# Patient Record
Sex: MALE | Race: WHITE | NOT HISPANIC OR LATINO | Employment: UNEMPLOYED | URBAN - METROPOLITAN AREA
[De-identification: names, ages, dates, MRNs, and addresses within clinical notes are randomized per-mention and may not be internally consistent; named-entity substitution may affect disease eponyms.]

---

## 2017-10-18 ENCOUNTER — HOSPITAL ENCOUNTER (INPATIENT)
Facility: MEDICAL CENTER | Age: 52
LOS: 1 days | DRG: 350 | End: 2017-10-19
Attending: COLON & RECTAL SURGERY | Admitting: COLON & RECTAL SURGERY
Payer: MEDICAID

## 2017-10-18 ENCOUNTER — RESOLUTE PROFESSIONAL BILLING HOSPITAL PROF FEE (OUTPATIENT)
Dept: HOSPITALIST | Facility: MEDICAL CENTER | Age: 52
End: 2017-10-18
Payer: MEDICAID

## 2017-10-18 PROBLEM — T88.3XXA MALIGNANT HYPERTHERMIA: Status: ACTIVE | Noted: 2017-10-18

## 2017-10-18 PROBLEM — J96.00 ACUTE RESPIRATORY FAILURE (HCC): Status: ACTIVE | Noted: 2017-10-18

## 2017-10-18 PROBLEM — K46.9 RECURRENT HERNIA: Status: ACTIVE | Noted: 2017-10-18

## 2017-10-18 PROBLEM — E87.5 HYPERKALEMIA: Status: ACTIVE | Noted: 2017-10-18

## 2017-10-18 LAB
ABO GROUP BLD: NORMAL
ALBUMIN SERPL BCP-MCNC: 3.1 G/DL (ref 3.2–4.9)
ALBUMIN SERPL BCP-MCNC: 3.5 G/DL (ref 3.2–4.9)
ALBUMIN SERPL BCP-MCNC: 3.9 G/DL (ref 3.2–4.9)
ALBUMIN SERPL BCP-MCNC: 4 G/DL (ref 3.2–4.9)
ALBUMIN SERPL BCP-MCNC: 4 G/DL (ref 3.2–4.9)
ALBUMIN SERPL BCP-MCNC: 4.1 G/DL (ref 3.2–4.9)
ALBUMIN/GLOB SERPL: 1.6 G/DL
ALBUMIN/GLOB SERPL: 1.7 G/DL
ALP SERPL-CCNC: 45 U/L (ref 30–99)
ALP SERPL-CCNC: 49 U/L (ref 30–99)
ALP SERPL-CCNC: 54 U/L (ref 30–99)
ALP SERPL-CCNC: 55 U/L (ref 30–99)
ALP SERPL-CCNC: 56 U/L (ref 30–99)
ALP SERPL-CCNC: 60 U/L (ref 30–99)
ALT SERPL-CCNC: 11 U/L (ref 2–50)
ALT SERPL-CCNC: 33 U/L (ref 2–50)
ALT SERPL-CCNC: 34 U/L (ref 2–50)
ALT SERPL-CCNC: 35 U/L (ref 2–50)
ALT SERPL-CCNC: 36 U/L (ref 2–50)
ALT SERPL-CCNC: 41 U/L (ref 2–50)
ANION GAP SERPL CALC-SCNC: 10 MMOL/L (ref 0–11.9)
ANION GAP SERPL CALC-SCNC: 11 MMOL/L (ref 0–11.9)
ANION GAP SERPL CALC-SCNC: 3 MMOL/L (ref 0–11.9)
ANION GAP SERPL CALC-SCNC: 5 MMOL/L (ref 0–11.9)
ANION GAP SERPL CALC-SCNC: 5 MMOL/L (ref 0–11.9)
ANION GAP SERPL CALC-SCNC: 6 MMOL/L (ref 0–11.9)
ANION GAP SERPL CALC-SCNC: 6 MMOL/L (ref 0–11.9)
AST SERPL-CCNC: 12 U/L (ref 12–45)
AST SERPL-CCNC: 31 U/L (ref 12–45)
AST SERPL-CCNC: 35 U/L (ref 12–45)
AST SERPL-CCNC: 36 U/L (ref 12–45)
AST SERPL-CCNC: 47 U/L (ref 12–45)
AST SERPL-CCNC: 48 U/L (ref 12–45)
BASE EXCESS BLDA CALC-SCNC: -4 MMOL/L (ref -4–3)
BASE EXCESS BLDA CALC-SCNC: -4 MMOL/L (ref -4–3)
BASE EXCESS BLDA CALC-SCNC: 1 MMOL/L (ref -4–3)
BASE EXCESS BLDA CALC-SCNC: 1 MMOL/L (ref -4–3)
BASE EXCESS BLDA CALC-SCNC: 2 MMOL/L (ref -4–3)
BASE EXCESS BLDA CALC-SCNC: 4 MMOL/L (ref -4–3)
BASOPHILS # BLD AUTO: 0 % (ref 0–1.8)
BASOPHILS # BLD: 0 K/UL (ref 0–0.12)
BILIRUB SERPL-MCNC: 0.4 MG/DL (ref 0.1–1.5)
BILIRUB SERPL-MCNC: 0.6 MG/DL (ref 0.1–1.5)
BLD GP AB SCN SERPL QL: NORMAL
BODY TEMPERATURE: ABNORMAL CENTIGRADE
BODY TEMPERATURE: ABNORMAL DEGREES
BUN SERPL-MCNC: 19 MG/DL (ref 8–22)
BUN SERPL-MCNC: 19 MG/DL (ref 8–22)
BUN SERPL-MCNC: 20 MG/DL (ref 8–22)
BUN SERPL-MCNC: 23 MG/DL (ref 8–22)
BUN SERPL-MCNC: 24 MG/DL (ref 8–22)
BUN SERPL-MCNC: 24 MG/DL (ref 8–22)
BUN SERPL-MCNC: 28 MG/DL (ref 8–22)
CA-I BLD ISE-SCNC: 1.11 MMOL/L (ref 1.1–1.3)
CA-I BLD ISE-SCNC: 1.27 MMOL/L (ref 1.1–1.3)
CALCIUM SERPL-MCNC: 7.6 MG/DL (ref 8.5–10.5)
CALCIUM SERPL-MCNC: 7.8 MG/DL (ref 8.5–10.5)
CALCIUM SERPL-MCNC: 8.6 MG/DL (ref 8.5–10.5)
CALCIUM SERPL-MCNC: 8.9 MG/DL (ref 8.5–10.5)
CALCIUM SERPL-MCNC: 9 MG/DL (ref 8.5–10.5)
CALCIUM SERPL-MCNC: 9.2 MG/DL (ref 8.5–10.5)
CALCIUM SERPL-MCNC: 9.2 MG/DL (ref 8.5–10.5)
CHLORIDE SERPL-SCNC: 100 MMOL/L (ref 96–112)
CHLORIDE SERPL-SCNC: 101 MMOL/L (ref 96–112)
CHLORIDE SERPL-SCNC: 103 MMOL/L (ref 96–112)
CHLORIDE SERPL-SCNC: 104 MMOL/L (ref 96–112)
CHLORIDE SERPL-SCNC: 104 MMOL/L (ref 96–112)
CHLORIDE SERPL-SCNC: 108 MMOL/L (ref 96–112)
CHLORIDE SERPL-SCNC: 108 MMOL/L (ref 96–112)
CK SERPL-CCNC: 30 U/L (ref 0–154)
CK SERPL-CCNC: 60 U/L (ref 0–154)
CK SERPL-CCNC: 67 U/L (ref 0–154)
CK SERPL-CCNC: 71 U/L (ref 0–154)
CK SERPL-CCNC: 73 U/L (ref 0–154)
CK SERPL-CCNC: 80 U/L (ref 0–154)
CO2 BLDA-SCNC: 26 MMOL/L (ref 20–33)
CO2 BLDA-SCNC: 27 MMOL/L (ref 20–33)
CO2 BLDA-SCNC: 28 MMOL/L (ref 20–33)
CO2 BLDA-SCNC: 28 MMOL/L (ref 20–33)
CO2 BLDA-SCNC: 31 MMOL/L (ref 20–33)
CO2 SERPL-SCNC: 20 MMOL/L (ref 20–33)
CO2 SERPL-SCNC: 22 MMOL/L (ref 20–33)
CO2 SERPL-SCNC: 23 MMOL/L (ref 20–33)
CO2 SERPL-SCNC: 23 MMOL/L (ref 20–33)
CO2 SERPL-SCNC: 24 MMOL/L (ref 20–33)
CO2 SERPL-SCNC: 24 MMOL/L (ref 20–33)
CO2 SERPL-SCNC: 26 MMOL/L (ref 20–33)
CREAT SERPL-MCNC: 0.86 MG/DL (ref 0.5–1.4)
CREAT SERPL-MCNC: 0.96 MG/DL (ref 0.5–1.4)
CREAT SERPL-MCNC: 1.03 MG/DL (ref 0.5–1.4)
CREAT SERPL-MCNC: 1.04 MG/DL (ref 0.5–1.4)
CREAT SERPL-MCNC: 1.07 MG/DL (ref 0.5–1.4)
CREAT SERPL-MCNC: 1.07 MG/DL (ref 0.5–1.4)
CREAT SERPL-MCNC: 1.39 MG/DL (ref 0.5–1.4)
EKG IMPRESSION: NORMAL
EKG IMPRESSION: NORMAL
EOSINOPHIL # BLD AUTO: 0.49 K/UL (ref 0–0.51)
EOSINOPHIL NFR BLD: 3 % (ref 0–6.9)
ERYTHROCYTE [DISTWIDTH] IN BLOOD BY AUTOMATED COUNT: 45.3 FL (ref 35.9–50)
GFR SERPL CREATININE-BSD FRML MDRD: 54 ML/MIN/1.73 M 2
GFR SERPL CREATININE-BSD FRML MDRD: >60 ML/MIN/1.73 M 2
GLOBULIN SER CALC-MCNC: 1.8 G/DL (ref 1.9–3.5)
GLOBULIN SER CALC-MCNC: 2.2 G/DL (ref 1.9–3.5)
GLOBULIN SER CALC-MCNC: 2.4 G/DL (ref 1.9–3.5)
GLOBULIN SER CALC-MCNC: 2.5 G/DL (ref 1.9–3.5)
GLUCOSE BLD-MCNC: 156 MG/DL (ref 65–99)
GLUCOSE SERPL-MCNC: 109 MG/DL (ref 65–99)
GLUCOSE SERPL-MCNC: 110 MG/DL (ref 65–99)
GLUCOSE SERPL-MCNC: 126 MG/DL (ref 65–99)
GLUCOSE SERPL-MCNC: 128 MG/DL (ref 65–99)
GLUCOSE SERPL-MCNC: 133 MG/DL (ref 65–99)
GLUCOSE SERPL-MCNC: 150 MG/DL (ref 65–99)
GLUCOSE SERPL-MCNC: 87 MG/DL (ref 65–99)
HCO3 BLDA-SCNC: 24 MMOL/L (ref 17–25)
HCO3 BLDA-SCNC: 25 MMOL/L (ref 17–25)
HCO3 BLDA-SCNC: 26.1 MMOL/L (ref 17–25)
HCO3 BLDA-SCNC: 26.3 MMOL/L (ref 17–25)
HCO3 BLDA-SCNC: 26.5 MMOL/L (ref 17–25)
HCO3 BLDA-SCNC: 28 MMOL/L (ref 17–25)
HCT VFR BLD AUTO: 39 % (ref 42–52)
HCT VFR BLD CALC: 40 % (ref 42–52)
HCT VFR BLD CALC: 42 % (ref 42–52)
HGB BLD-MCNC: 13.4 G/DL (ref 14–18)
HGB BLD-MCNC: 13.6 G/DL (ref 14–18)
HGB BLD-MCNC: 14.3 G/DL (ref 14–18)
LACTATE BLD-SCNC: 1.7 MMOL/L (ref 0.5–2)
LYMPHOCYTES # BLD AUTO: 8.29 K/UL (ref 1–4.8)
LYMPHOCYTES NFR BLD: 51.2 % (ref 22–41)
MANUAL DIFF BLD: NORMAL
MCH RBC QN AUTO: 33.5 PG (ref 27–33)
MCHC RBC AUTO-ENTMCNC: 34.4 G/DL (ref 33.7–35.3)
MCV RBC AUTO: 97.5 FL (ref 81.4–97.8)
MONOCYTES # BLD AUTO: 0.79 K/UL (ref 0–0.85)
MONOCYTES NFR BLD AUTO: 4.9 % (ref 0–13.4)
MORPHOLOGY BLD-IMP: NORMAL
MYELOCYTES NFR BLD MANUAL: 0.5 %
MYOGLOBIN SERPL-MCNC: 42 NG/ML (ref 0–110)
MYOGLOBIN SERPL-MCNC: 61 NG/ML (ref 0–110)
MYOGLOBIN SERPL-MCNC: 67 NG/ML (ref 0–110)
MYOGLOBIN SERPL-MCNC: 71 NG/ML (ref 0–110)
MYOGLOBIN SERPL-MCNC: 73 NG/ML (ref 0–110)
NEUTROPHILS # BLD AUTO: 6.46 K/UL (ref 1.82–7.42)
NEUTROPHILS NFR BLD: 39.4 % (ref 44–72)
NEUTS BAND NFR BLD MANUAL: 0.5 % (ref 0–10)
NRBC # BLD AUTO: 0 K/UL
NRBC BLD AUTO-RTO: 0 /100 WBC
O2/TOTAL GAS SETTING VFR VENT: 100 %
O2/TOTAL GAS SETTING VFR VENT: 28 %
O2/TOTAL GAS SETTING VFR VENT: 30 %
O2/TOTAL GAS SETTING VFR VENT: 40 %
PATH REV: NORMAL
PATH REV: NORMAL
PCO2 BLDA: 34.2 MMHG (ref 26–37)
PCO2 BLDA: 35.5 MMHG (ref 26–37)
PCO2 BLDA: 39 MMHG (ref 26–37)
PCO2 BLDA: 42.5 MMHG (ref 26–37)
PCO2 BLDA: 55.8 MMHG (ref 26–37)
PCO2 BLDA: 89.2 MMHG (ref 26–37)
PCO2 TEMP ADJ BLDA: 35.6 MMHG (ref 26–37)
PCO2 TEMP ADJ BLDA: 36.3 MMHG (ref 26–37)
PCO2 TEMP ADJ BLDA: 39.4 MMHG (ref 26–37)
PCO2 TEMP ADJ BLDA: 41.6 MMHG (ref 26–37)
PH BLDA: 7.1 [PH] (ref 7.4–7.5)
PH BLDA: 7.24 [PH] (ref 7.4–7.5)
PH BLDA: 7.4 [PH] (ref 7.4–7.5)
PH BLDA: 7.43 [PH] (ref 7.4–7.5)
PH BLDA: 7.46 [PH] (ref 7.4–7.5)
PH BLDA: 7.5 [PH] (ref 7.4–7.5)
PH TEMP ADJ BLDA: 7.41 [PH] (ref 7.4–7.5)
PH TEMP ADJ BLDA: 7.43 [PH] (ref 7.4–7.5)
PH TEMP ADJ BLDA: 7.45 [PH] (ref 7.4–7.5)
PH TEMP ADJ BLDA: 7.48 [PH] (ref 7.4–7.5)
PLATELET # BLD AUTO: 215 K/UL (ref 164–446)
PLATELET BLD QL SMEAR: NORMAL
PMV BLD AUTO: 11.6 FL (ref 9–12.9)
PO2 BLDA: 109 MMHG (ref 64–87)
PO2 BLDA: 127 MMHG (ref 64–87)
PO2 BLDA: 136 MMHG (ref 64–87)
PO2 BLDA: 156.6 MMHG (ref 64–87)
PO2 BLDA: 401 MMHG (ref 64–87)
PO2 BLDA: 87 MMHG (ref 64–87)
PO2 TEMP ADJ BLDA: 130 MMHG (ref 64–87)
PO2 TEMP ADJ BLDA: 138 MMHG (ref 64–87)
PO2 TEMP ADJ BLDA: 398 MMHG (ref 64–87)
PO2 TEMP ADJ BLDA: 92 MMHG (ref 64–87)
POTASSIUM BLD-SCNC: 4.8 MMOL/L (ref 3.6–5.5)
POTASSIUM BLD-SCNC: 5.9 MMOL/L (ref 3.6–5.5)
POTASSIUM SERPL-SCNC: 4.8 MMOL/L (ref 3.6–5.5)
POTASSIUM SERPL-SCNC: 4.8 MMOL/L (ref 3.6–5.5)
POTASSIUM SERPL-SCNC: 4.9 MMOL/L (ref 3.6–5.5)
POTASSIUM SERPL-SCNC: 5.5 MMOL/L (ref 3.6–5.5)
POTASSIUM SERPL-SCNC: 5.5 MMOL/L (ref 3.6–5.5)
POTASSIUM SERPL-SCNC: 6 MMOL/L (ref 3.6–5.5)
POTASSIUM SERPL-SCNC: 6.6 MMOL/L (ref 3.6–5.5)
PROT SERPL-MCNC: 4.9 G/DL (ref 6–8.2)
PROT SERPL-MCNC: 5.7 G/DL (ref 6–8.2)
PROT SERPL-MCNC: 6.3 G/DL (ref 6–8.2)
PROT SERPL-MCNC: 6.4 G/DL (ref 6–8.2)
PROT SERPL-MCNC: 6.5 G/DL (ref 6–8.2)
PROT SERPL-MCNC: 6.5 G/DL (ref 6–8.2)
RBC # BLD AUTO: 4 M/UL (ref 4.7–6.1)
RBC BLD AUTO: PRESENT
RH BLD: NORMAL
SAO2 % BLDA: 100 % (ref 93–99)
SAO2 % BLDA: 95 % (ref 93–99)
SAO2 % BLDA: 98 % (ref 93–99)
SAO2 % BLDA: 98.4 % (ref 93–99)
SAO2 % BLDA: 99 % (ref 93–99)
SAO2 % BLDA: 99 % (ref 93–99)
SMUDGE CELLS BLD QL SMEAR: NORMAL
SODIUM BLD-SCNC: 133 MMOL/L (ref 135–145)
SODIUM BLD-SCNC: 135 MMOL/L (ref 135–145)
SODIUM SERPL-SCNC: 130 MMOL/L (ref 135–145)
SODIUM SERPL-SCNC: 131 MMOL/L (ref 135–145)
SODIUM SERPL-SCNC: 132 MMOL/L (ref 135–145)
SODIUM SERPL-SCNC: 135 MMOL/L (ref 135–145)
SODIUM SERPL-SCNC: 135 MMOL/L (ref 135–145)
SODIUM SERPL-SCNC: 136 MMOL/L (ref 135–145)
SODIUM SERPL-SCNC: 137 MMOL/L (ref 135–145)
SPECIMEN DRAWN FROM PATIENT: ABNORMAL
TRIGL SERPL-MCNC: 342 MG/DL (ref 0–149)
TROPONIN I SERPL-MCNC: <0.01 NG/ML (ref 0–0.04)
WBC # BLD AUTO: 16.2 K/UL (ref 4.8–10.8)
WBC OTHER NFR BLD MANUAL: 0.5 %

## 2017-10-18 PROCEDURE — 80048 BASIC METABOLIC PNL TOTAL CA: CPT

## 2017-10-18 PROCEDURE — 501568 HCHG TROCAR, BLUNTPORT 12MM: Performed by: COLON & RECTAL SURGERY

## 2017-10-18 PROCEDURE — C1781 MESH (IMPLANTABLE): HCPCS | Performed by: COLON & RECTAL SURGERY

## 2017-10-18 PROCEDURE — 85014 HEMATOCRIT: CPT

## 2017-10-18 PROCEDURE — 93010 ELECTROCARDIOGRAM REPORT: CPT | Mod: 76 | Performed by: INTERNAL MEDICINE

## 2017-10-18 PROCEDURE — 700101 HCHG RX REV CODE 250

## 2017-10-18 PROCEDURE — 700111 HCHG RX REV CODE 636 W/ 250 OVERRIDE (IP): Performed by: SURGERY

## 2017-10-18 PROCEDURE — 86901 BLOOD TYPING SEROLOGIC RH(D): CPT

## 2017-10-18 PROCEDURE — 82550 ASSAY OF CK (CPK): CPT | Mod: 91

## 2017-10-18 PROCEDURE — 501838 HCHG SUTURE GENERAL: Performed by: COLON & RECTAL SURGERY

## 2017-10-18 PROCEDURE — 501497 HCHG SURGICLIP: Performed by: COLON & RECTAL SURGERY

## 2017-10-18 PROCEDURE — 700105 HCHG RX REV CODE 258: Performed by: SURGERY

## 2017-10-18 PROCEDURE — 86850 RBC ANTIBODY SCREEN: CPT

## 2017-10-18 PROCEDURE — 80053 COMPREHEN METABOLIC PANEL: CPT

## 2017-10-18 PROCEDURE — 502571 HCHG PACK, LAP CHOLE: Performed by: COLON & RECTAL SURGERY

## 2017-10-18 PROCEDURE — 160009 HCHG ANES TIME/MIN: Performed by: COLON & RECTAL SURGERY

## 2017-10-18 PROCEDURE — 99291 CRITICAL CARE FIRST HOUR: CPT | Performed by: SURGERY

## 2017-10-18 PROCEDURE — 700111 HCHG RX REV CODE 636 W/ 250 OVERRIDE (IP): Performed by: ANESTHESIOLOGY

## 2017-10-18 PROCEDURE — 700105 HCHG RX REV CODE 258: Performed by: ANESTHESIOLOGY

## 2017-10-18 PROCEDURE — 0YU54JZ SUPPLEMENT RIGHT INGUINAL REGION WITH SYNTHETIC SUBSTITUTE, PERCUTANEOUS ENDOSCOPIC APPROACH: ICD-10-PCS | Performed by: COLON & RECTAL SURGERY

## 2017-10-18 PROCEDURE — 94002 VENT MGMT INPAT INIT DAY: CPT

## 2017-10-18 PROCEDURE — 500048 HCHG BALLOON, TROCAR FOR HERNIA: Performed by: COLON & RECTAL SURGERY

## 2017-10-18 PROCEDURE — 99292 CRITICAL CARE ADDL 30 MIN: CPT | Performed by: SURGERY

## 2017-10-18 PROCEDURE — 501574 HCHG TROCAR, SMTH CAN&SEAL 5: Performed by: COLON & RECTAL SURGERY

## 2017-10-18 PROCEDURE — 84295 ASSAY OF SERUM SODIUM: CPT | Mod: 91

## 2017-10-18 PROCEDURE — A9270 NON-COVERED ITEM OR SERVICE: HCPCS | Performed by: SURGERY

## 2017-10-18 PROCEDURE — 85007 BL SMEAR W/DIFF WBC COUNT: CPT

## 2017-10-18 PROCEDURE — 82330 ASSAY OF CALCIUM: CPT | Mod: 91

## 2017-10-18 PROCEDURE — 83874 ASSAY OF MYOGLOBIN: CPT

## 2017-10-18 PROCEDURE — 700102 HCHG RX REV CODE 250 W/ 637 OVERRIDE(OP): Performed by: SURGERY

## 2017-10-18 PROCEDURE — 86900 BLOOD TYPING SEROLOGIC ABO: CPT

## 2017-10-18 PROCEDURE — 82947 ASSAY GLUCOSE BLOOD QUANT: CPT

## 2017-10-18 PROCEDURE — 770022 HCHG ROOM/CARE - ICU (200)

## 2017-10-18 PROCEDURE — 82803 BLOOD GASES ANY COMBINATION: CPT | Mod: 91

## 2017-10-18 PROCEDURE — 700111 HCHG RX REV CODE 636 W/ 250 OVERRIDE (IP)

## 2017-10-18 PROCEDURE — 84132 ASSAY OF SERUM POTASSIUM: CPT

## 2017-10-18 PROCEDURE — 84484 ASSAY OF TROPONIN QUANT: CPT

## 2017-10-18 PROCEDURE — 85027 COMPLETE CBC AUTOMATED: CPT

## 2017-10-18 PROCEDURE — 501570 HCHG TROCAR, SEPARATOR: Performed by: COLON & RECTAL SURGERY

## 2017-10-18 PROCEDURE — 160028 HCHG SURGERY MINUTES - 1ST 30 MINS LEVEL 3: Performed by: COLON & RECTAL SURGERY

## 2017-10-18 PROCEDURE — 84478 ASSAY OF TRIGLYCERIDES: CPT

## 2017-10-18 PROCEDURE — 700101 HCHG RX REV CODE 250: Performed by: SURGERY

## 2017-10-18 PROCEDURE — 37799 UNLISTED PX VASCULAR SURGERY: CPT

## 2017-10-18 PROCEDURE — 160039 HCHG SURGERY MINUTES - EA ADDL 1 MIN LEVEL 3: Performed by: COLON & RECTAL SURGERY

## 2017-10-18 PROCEDURE — 501583 HCHG TROCAR, THRD CAN&SEAL 5X100: Performed by: COLON & RECTAL SURGERY

## 2017-10-18 PROCEDURE — 93005 ELECTROCARDIOGRAM TRACING: CPT | Performed by: SURGERY

## 2017-10-18 PROCEDURE — 80500 HCHG CLINICAL PATH CONSULT-LIMITED: CPT

## 2017-10-18 PROCEDURE — 160048 HCHG OR STATISTICAL LEVEL 1-5: Performed by: COLON & RECTAL SURGERY

## 2017-10-18 PROCEDURE — 83605 ASSAY OF LACTIC ACID: CPT

## 2017-10-18 PROCEDURE — 93005 ELECTROCARDIOGRAM TRACING: CPT | Performed by: COLON & RECTAL SURGERY

## 2017-10-18 DEVICE — MESH 3D MAX RIGHT 10.8 X 16CM - LARGE (1EA/CA): Type: IMPLANTABLE DEVICE | Status: FUNCTIONAL

## 2017-10-18 DEVICE — MESH PERFIX PLUG LARGE - 4.1CM X 4.8CM (1EA/CA): Type: IMPLANTABLE DEVICE | Status: FUNCTIONAL

## 2017-10-18 RX ORDER — BISACODYL 10 MG
10 SUPPOSITORY, RECTAL RECTAL
Status: DISCONTINUED | OUTPATIENT
Start: 2017-10-18 | End: 2017-10-19 | Stop reason: HOSPADM

## 2017-10-18 RX ORDER — OXYCODONE HYDROCHLORIDE 10 MG/1
10 TABLET ORAL
Status: DISCONTINUED | OUTPATIENT
Start: 2017-10-18 | End: 2017-10-19 | Stop reason: HOSPADM

## 2017-10-18 RX ORDER — OXYCODONE HYDROCHLORIDE 5 MG/1
5-10 TABLET ORAL
Status: DISCONTINUED | OUTPATIENT
Start: 2017-10-18 | End: 2017-10-18

## 2017-10-18 RX ORDER — AMOXICILLIN 250 MG
1 CAPSULE ORAL NIGHTLY
Status: DISCONTINUED | OUTPATIENT
Start: 2017-10-18 | End: 2017-10-19 | Stop reason: HOSPADM

## 2017-10-18 RX ORDER — FAMOTIDINE 20 MG/1
20 TABLET, FILM COATED ORAL 2 TIMES DAILY
Status: DISCONTINUED | OUTPATIENT
Start: 2017-10-18 | End: 2017-10-19 | Stop reason: HOSPADM

## 2017-10-18 RX ORDER — BACITRACIN 50000 [IU]/1
INJECTION, POWDER, FOR SOLUTION INTRAMUSCULAR
Status: DISCONTINUED | OUTPATIENT
Start: 2017-10-18 | End: 2017-10-18 | Stop reason: HOSPADM

## 2017-10-18 RX ORDER — OXYCODONE HYDROCHLORIDE 5 MG/1
5 TABLET ORAL
Status: DISCONTINUED | OUTPATIENT
Start: 2017-10-18 | End: 2017-10-19 | Stop reason: HOSPADM

## 2017-10-18 RX ORDER — CHLORHEXIDINE GLUCONATE ORAL RINSE 1.2 MG/ML
15 SOLUTION DENTAL EVERY 12 HOURS
Status: DISCONTINUED | OUTPATIENT
Start: 2017-10-18 | End: 2017-10-18

## 2017-10-18 RX ORDER — LIDOCAINE AND PRILOCAINE 25; 25 MG/G; MG/G
1 CREAM TOPICAL
Status: COMPLETED | OUTPATIENT
Start: 2017-10-18 | End: 2017-10-18

## 2017-10-18 RX ORDER — LISINOPRIL 5 MG/1
5 TABLET ORAL DAILY
COMMUNITY

## 2017-10-18 RX ORDER — LIDOCAINE HYDROCHLORIDE 10 MG/ML
0.5 INJECTION, SOLUTION INFILTRATION; PERINEURAL
Status: COMPLETED | OUTPATIENT
Start: 2017-10-18 | End: 2017-10-18

## 2017-10-18 RX ORDER — SODIUM CHLORIDE, SODIUM LACTATE, POTASSIUM CHLORIDE, CALCIUM CHLORIDE 600; 310; 30; 20 MG/100ML; MG/100ML; MG/100ML; MG/100ML
INJECTION, SOLUTION INTRAVENOUS CONTINUOUS
Status: DISCONTINUED | OUTPATIENT
Start: 2017-10-18 | End: 2017-10-19 | Stop reason: HOSPADM

## 2017-10-18 RX ORDER — DOCUSATE SODIUM 100 MG/1
100 CAPSULE, LIQUID FILLED ORAL 2 TIMES DAILY
Status: DISCONTINUED | OUTPATIENT
Start: 2017-10-18 | End: 2017-10-19 | Stop reason: HOSPADM

## 2017-10-18 RX ORDER — BUPIVACAINE HYDROCHLORIDE AND EPINEPHRINE 5; 5 MG/ML; UG/ML
INJECTION, SOLUTION EPIDURAL; INTRACAUDAL; PERINEURAL
Status: DISCONTINUED | OUTPATIENT
Start: 2017-10-18 | End: 2017-10-18 | Stop reason: HOSPADM

## 2017-10-18 RX ORDER — HYDRALAZINE HYDROCHLORIDE 20 MG/ML
20 INJECTION INTRAMUSCULAR; INTRAVENOUS EVERY 4 HOURS PRN
Status: DISCONTINUED | OUTPATIENT
Start: 2017-10-18 | End: 2017-10-19 | Stop reason: HOSPADM

## 2017-10-18 RX ORDER — POLYETHYLENE GLYCOL 3350 17 G/17G
1 POWDER, FOR SOLUTION ORAL 2 TIMES DAILY
Status: DISCONTINUED | OUTPATIENT
Start: 2017-10-18 | End: 2017-10-19 | Stop reason: HOSPADM

## 2017-10-18 RX ORDER — LIDOCAINE HYDROCHLORIDE 10 MG/ML
INJECTION, SOLUTION INFILTRATION; PERINEURAL
Status: COMPLETED
Start: 2017-10-18 | End: 2017-10-18

## 2017-10-18 RX ORDER — LISINOPRIL 10 MG/1
10 TABLET ORAL DAILY
Status: ON HOLD | COMMUNITY
End: 2017-10-18

## 2017-10-18 RX ORDER — SODIUM CHLORIDE 9 MG/ML
INJECTION, SOLUTION INTRAVENOUS CONTINUOUS
Status: DISCONTINUED | OUTPATIENT
Start: 2017-10-18 | End: 2017-10-18

## 2017-10-18 RX ORDER — ENEMA 19; 7 G/133ML; G/133ML
1 ENEMA RECTAL
Status: DISCONTINUED | OUTPATIENT
Start: 2017-10-18 | End: 2017-10-19 | Stop reason: HOSPADM

## 2017-10-18 RX ORDER — AMOXICILLIN 250 MG
1 CAPSULE ORAL
Status: DISCONTINUED | OUTPATIENT
Start: 2017-10-18 | End: 2017-10-19 | Stop reason: HOSPADM

## 2017-10-18 RX ORDER — ONDANSETRON 2 MG/ML
4 INJECTION INTRAMUSCULAR; INTRAVENOUS EVERY 4 HOURS PRN
Status: DISCONTINUED | OUTPATIENT
Start: 2017-10-18 | End: 2017-10-19 | Stop reason: HOSPADM

## 2017-10-18 RX ORDER — LABETALOL HYDROCHLORIDE 5 MG/ML
10 INJECTION, SOLUTION INTRAVENOUS EVERY 4 HOURS PRN
Status: DISCONTINUED | OUTPATIENT
Start: 2017-10-18 | End: 2017-10-19 | Stop reason: HOSPADM

## 2017-10-18 RX ORDER — HYDROXYZINE 50 MG/1
50 TABLET, FILM COATED ORAL
COMMUNITY

## 2017-10-18 RX ADMIN — PROPOFOL 80 MCG/KG/MIN: 10 INJECTION, EMULSION INTRAVENOUS at 11:58

## 2017-10-18 RX ADMIN — FAMOTIDINE 20 MG: 10 INJECTION, SOLUTION INTRAVENOUS at 10:40

## 2017-10-18 RX ADMIN — SODIUM CHLORIDE, SODIUM LACTATE, POTASSIUM CHLORIDE, CALCIUM CHLORIDE: 600; 310; 30; 20 INJECTION, SOLUTION INTRAVENOUS at 08:00

## 2017-10-18 RX ADMIN — SODIUM BICARBONATE 150 MEQ: 84 INJECTION, SOLUTION INTRAVENOUS at 10:48

## 2017-10-18 RX ADMIN — CHLORHEXIDINE GLUCONATE 15 ML: 1.2 RINSE ORAL at 10:40

## 2017-10-18 RX ADMIN — OXYCODONE HYDROCHLORIDE 5 MG: 5 TABLET ORAL at 20:28

## 2017-10-18 RX ADMIN — HYDROMORPHONE HYDROCHLORIDE 1 MG: 1 INJECTION, SOLUTION INTRAMUSCULAR; INTRAVENOUS; SUBCUTANEOUS at 17:02

## 2017-10-18 RX ADMIN — FAMOTIDINE 20 MG: 20 TABLET, FILM COATED ORAL at 20:28

## 2017-10-18 RX ADMIN — LIDOCAINE HYDROCHLORIDE 0.5 ML: 10 INJECTION, SOLUTION INFILTRATION; PERINEURAL at 08:00

## 2017-10-18 RX ADMIN — OXYCODONE HYDROCHLORIDE 10 MG: 10 TABLET ORAL at 23:49

## 2017-10-18 RX ADMIN — SODIUM CHLORIDE: 9 INJECTION, SOLUTION INTRAVENOUS at 10:43

## 2017-10-18 RX ADMIN — HYDRALAZINE HYDROCHLORIDE 20 MG: 20 INJECTION INTRAMUSCULAR; INTRAVENOUS at 16:59

## 2017-10-18 RX ADMIN — SODIUM BICARBONATE 150 MEQ: 84 INJECTION, SOLUTION INTRAVENOUS at 22:01

## 2017-10-18 RX ADMIN — HYDROMORPHONE HYDROCHLORIDE 1 MG: 1 INJECTION, SOLUTION INTRAMUSCULAR; INTRAVENOUS; SUBCUTANEOUS at 10:40

## 2017-10-18 RX ADMIN — LABETALOL HYDROCHLORIDE 10 MG: 5 INJECTION, SOLUTION INTRAVENOUS at 20:28

## 2017-10-18 RX ADMIN — HYDRALAZINE HYDROCHLORIDE 20 MG: 20 INJECTION INTRAMUSCULAR; INTRAVENOUS at 10:48

## 2017-10-18 RX ADMIN — PROPOFOL 40 MCG/KG/MIN: 10 INJECTION, EMULSION INTRAVENOUS at 09:48

## 2017-10-18 ASSESSMENT — LIFESTYLE VARIABLES
TOTAL SCORE: 3
HAVE YOU EVER FELT YOU SHOULD CUT DOWN ON YOUR DRINKING: YES
EVER_SMOKED: YES
EVER_SMOKED: YES
ALCOHOL_USE: YES
CONSUMPTION TOTAL: POSITIVE
HAVE PEOPLE ANNOYED YOU BY CRITICIZING YOUR DRINKING: NO
EVER HAD A DRINK FIRST THING IN THE MORNING TO STEADY YOUR NERVES TO GET RID OF A HANGOVER: YES
DOES PATIENT WANT TO TALK TO SOMEONE ABOUT QUITTING: NO
EVER FELT BAD OR GUILTY ABOUT YOUR DRINKING: YES
TOTAL SCORE: 3
TOTAL SCORE: 3
ON A TYPICAL DAY WHEN YOU DRINK ALCOHOL HOW MANY DRINKS DO YOU HAVE: 5
AVERAGE NUMBER OF DAYS PER WEEK YOU HAVE A DRINK CONTAINING ALCOHOL: 3
HOW MANY TIMES IN THE PAST YEAR HAVE YOU HAD 5 OR MORE DRINKS IN A DAY: 200
DOES PATIENT WANT TO STOP DRINKING: YES

## 2017-10-18 ASSESSMENT — PAIN SCALES - GENERAL
PAINLEVEL_OUTOF10: 8
PAINLEVEL_OUTOF10: 5
PAINLEVEL_OUTOF10: 6
PAINLEVEL_OUTOF10: 0
PAINLEVEL_OUTOF10: 5
PAINLEVEL_OUTOF10: 6
PAINLEVEL_OUTOF10: 7
PAINLEVEL_OUTOF10: 5

## 2017-10-18 ASSESSMENT — PATIENT HEALTH QUESTIONNAIRE - PHQ9
SUM OF ALL RESPONSES TO PHQ QUESTIONS 1-9: 0
2. FEELING DOWN, DEPRESSED, IRRITABLE, OR HOPELESS: NOT AT ALL
SUM OF ALL RESPONSES TO PHQ9 QUESTIONS 1 AND 2: 0
1. LITTLE INTEREST OR PLEASURE IN DOING THINGS: NOT AT ALL

## 2017-10-18 ASSESSMENT — COPD QUESTIONNAIRES
DURING THE PAST 4 WEEKS HOW MUCH DID YOU FEEL SHORT OF BREATH: NONE/LITTLE OF THE TIME
COPD SCREENING SCORE: 3
DO YOU EVER COUGH UP ANY MUCUS OR PHLEGM?: NO/ONLY WITH OCCASIONAL COLDS OR INFECTIONS
HAVE YOU SMOKED AT LEAST 100 CIGARETTES IN YOUR ENTIRE LIFE: YES

## 2017-10-18 NOTE — PROGRESS NOTES
"Patient extubated.  States he \"felt like that breathing tube was punctured in my heart.\"  Patient c/o chest pain 7/10.  Notified Dr. Martinez.  Troponin added onto 1500 lab.   "

## 2017-10-18 NOTE — PROGRESS NOTES
0950:  Patient arrived to S105 via bed accompanied by OR team.  Report received from Dr. Dennis and OR RN.     1010:  Dr. Martinez at bedside to assess patient.  Orders given and followed.

## 2017-10-18 NOTE — OP REPORT
NAME:  Josh Jacob  MRN:  [id number]  :  1965      DATE OF OPERATION: 10/18/2017    PREOPERATIVE DIAGNOSIS: Recurrent right Inguinal Hernia    POSTOPERATIVE DIAGNOSIS: Recurrent right Inguinal Hernia    OPERATION PERFORMED: Laparoscopic repair of Recurrent right Inguinal Hernia with Mesh    SURGEON: Gareth Morgan MD    ASSISTANT:  Helen Ward PA-C    ANESTHESIOLOGIST:  Gerardo Dennis MD. , MD    ANESTHESIA: General endotracheal anesthesia.  ** Patient had MH crisis during surgery **     SPECIMEN: None    ESTIMATED BLOOD LOSS: < 5cc.     INDICATIONS: The patient is a 52 y.o. male with a diagnosis of right groin bulge with Recurrent right inguinal hernia. He is taken to the operating room today for laparoscopic repair of inguinal hernia with mesh.     PROCEDURE: Following informed consent, the patient was properly identified, taken to the operating room, and placed in the supine position where general endotracheal anesthesia was administered. Intravenous antibiotics were administered by the anesthesiologist in the correct time interval. Sequential compression devices were employed. The abdomen was prepped and draped into a sterile field.     A small infraumbilical skin incision was made and dissection was carried to the right of midline.  A five mm trocar was introduced anto theperitoneal cavity and an inspection of the hernia performed. This demonstrated a moderately large 3cm hernia defect without bowel. It did appear we would be able to complete an effective preperitoneal repair, so we allowed the CO2 to escape. The anterior rectus abdominus fascia sheath was exposed and incised.  The preperitoneal plane was developed bluntly.  The Covidien dissecting trocar balloon instrument was then introduced into the preperitoneal space.  The hand pump was then used to create the preperitoneal dissection.    The balloon trocar was then removed and the CO2 insufflation and optical trocar was then advanced.  The CO2  insufflation was started and the laparoscope was introduced. This demonstrated a nice preperitoneal dissection.    Two additional 5mm trocars were then placed at midline just below the umbilicus.  Blunt dissectors were used to then dissect the right inguinal floor.  A moderately large large sized indirect inguinal hernia was present with the hernia sac extending up through the inguinal canal.  The peritoneum was slowly reduced and gradually .  The cord lipoma was also reduced.  The vessels and structures were carefully protected and a nice inguinal floor dissection was accomplished. A large sized mesh plug was introduced into the hernia defect.    Next, the large right contour mesh was soaked in Kantrex solution and was trimmed so that there was a generous keyhole.  The mesh was then rolled and introduced into the preperitoneal space.  It was then maneuvered into position.  The mesh was then secured to the periosteum along Seven's ligament and the pubic ramus with the absorbable tacking instrument.  It had very nice position obliterating the hernia defect without constriction of any structures.       Treatments for malignant hyperthermia were instituted and the procedure completed.         The mesh was then held into place with the pneumoperitoneum allowed to escape.  The port were then removed under direct vision.  The port sites were then irrigated well.  The fascia was closed with O Vicryl suture.  The port site skin incisions were closed with interrupted 4-0 Vicryl subcuticular sutures.  Steri-Strips and Benzoin were applied beneath sterile Band-Aids.     The patient tolerated the procedure well and there were no apparent surgical complications. All sponge, needle, and instrument counts were correct on 2 separate occasions. He was transferred to the SICU for management of the MH.      ____________________________________   Gareth Morgan MD  DD: 10/18/2017  9:35 AM    CC:  [facility];

## 2017-10-18 NOTE — PROGRESS NOTES
Yasmany from Lab called with critical result of K at 1258. Critical lab result read back to Germán.   Dr. Martinez notified of critical lab result at 1300.  Critical lab result read back by Dr. Martinez.

## 2017-10-18 NOTE — PROGRESS NOTES
Trauma/Surgical Progress Note    Author: Paetl Martinez Date & Time created: 10/18/2017   3:42 PM     Interval Events:  OR this morning for elective laparoscopic RIHR  Malignant Hyperthermia intra op - Dantrolene given  Brought to ICU for management, resuscitation    Hemodynamics:  Blood pressure 150/96, pulse 61, temperature 36.6 °C (97.8 °F), resp. rate 18, height 1.829 m (6'), weight 95.4 kg (210 lb 5.1 oz), SpO2 100 %.     Respiratory:  Fagan Vent Mode: APVCMV, Rate (breaths/min): 18, PEEP/CPAP: 8, FiO2: 30, P Peak (PIP): 18, P MEAN: 11 Respiration: 18, Pulse Oximetry: 100 %        RUL Breath Sounds: Clear, RML Breath Sounds: Clear, RLL Breath Sounds: Clear, SHERWIN Breath Sounds: Clear, LLL Breath Sounds: Clear  Fluids:    Intake/Output Summary (Last 24 hours) at 10/18/17 1542  Last data filed at 10/18/17 1200   Gross per 24 hour   Intake          4641.01 ml   Output              985 ml   Net          3656.01 ml     Admit Weight: 95.4 kg (210 lb 5.1 oz)  Current Weight: 95.4 kg (210 lb 5.1 oz)    Physical Exam   Constitutional:   Intubated, NAD   HENT:   Head: Normocephalic and atraumatic.   Eyes: EOM are normal. Pupils are equal, round, and reactive to light.   Neck: Neck supple. No tracheal deviation present.   Cardiovascular: Normal rate and regular rhythm.    Pulmonary/Chest: Effort normal. No respiratory distress.   Abdominal: Soft.   Dressings c/d/i   Genitourinary:   Genitourinary Comments: Parry in place draining clear yellow urine   Musculoskeletal: Normal range of motion. He exhibits no edema.   Neurological:   Follows commands   Skin: Skin is warm and dry.   Psychiatric:   Unable to assess     Nursing note and vitals reviewed.      Medical Decision Making/Problem List:    Active Hospital Problems    Diagnosis   • Acute respiratory failure (CMS-HCC) [J96.00]     Priority: High     Brought from OR intubated during hyperthermic crisis  SICU ventilator protocol in place, propofol for sedation     •  Malignant hyperthermia [T88.3XXA]     Priority: High     Elevated CO2 intraoperatively  17 vials of Dantrolene given,  hotline called  Bicarb drip  CMP, ABG, myoglobin, CK every two hours  External cooling as needed     • Hyperkalemia [E87.5]     Priority: High     Isolated hyperkalemia up to 6.6  EKG normal, asympotomatic, redraw postassium 6.0  Serial blood draws, treatment with insulin and glucose if persists or worsens     • Recurrent hernia [K46.9]     Laparoscopic RIHR 10/18  Dr. Morgan, general surgery       Core Measures & Quality Metrics:  Labs reviewed, Medications reviewed and Radiology images reviewed  Parry catheter: Critically Ill - Requiring Accurate Measurement of Urinary Output      DVT Prophylaxis: Contraindicated - High bleeding risk  DVT prophylaxis - mechanical: SCDs  Ulcer prophylaxis: Yes      Plan:  Wean ventilator as tolerated, trend ABGs. Decrease FiO2 and PEEP, increase spontaneous breathing percentages.  Bicarb drip  Serial CMP, CK, and myoglobin  Serial K, insulin and glucose if needed    LETTY Score  Discussed patient condition with RN, RT and Pharmacy.  The patient is/remains critically ill with acute respiratory failure, critical hyperkalemia, malignant hyperthermia.    I provided the following critical care services: ventilator adjustment, resuscitation, management of high risk medications (propofol), management of critical hyperkalemia and malignant hyperthermia.    Critical care time spent exclusive of procedures: 85 minutes.    Patel Martienz MD  [phone]

## 2017-10-18 NOTE — PROGRESS NOTES
Discussed patient's follow up KCL with Dr. Martinez.  Orders given to continue to monitor CMP q 2 hours.  Plan to extubate.

## 2017-10-19 ENCOUNTER — APPOINTMENT (OUTPATIENT)
Dept: RADIOLOGY | Facility: MEDICAL CENTER | Age: 52
DRG: 350 | End: 2017-10-19
Attending: SURGERY
Payer: MEDICAID

## 2017-10-19 VITALS
OXYGEN SATURATION: 95 % | TEMPERATURE: 97.8 F | WEIGHT: 210.76 LBS | RESPIRATION RATE: 55 BRPM | SYSTOLIC BLOOD PRESSURE: 150 MMHG | HEIGHT: 72 IN | DIASTOLIC BLOOD PRESSURE: 96 MMHG | HEART RATE: 84 BPM | BODY MASS INDEX: 28.55 KG/M2

## 2017-10-19 LAB
ALBUMIN SERPL BCP-MCNC: 3.7 G/DL (ref 3.2–4.9)
ALBUMIN/GLOB SERPL: 1.7 G/DL
ALP SERPL-CCNC: 50 U/L (ref 30–99)
ALT SERPL-CCNC: 24 U/L (ref 2–50)
ANION GAP SERPL CALC-SCNC: 9 MMOL/L (ref 0–11.9)
AST SERPL-CCNC: 20 U/L (ref 12–45)
BASOPHILS # BLD AUTO: 0.1 % (ref 0–1.8)
BASOPHILS # BLD: 0.01 K/UL (ref 0–0.12)
BILIRUB SERPL-MCNC: 0.4 MG/DL (ref 0.1–1.5)
BUN SERPL-MCNC: 17 MG/DL (ref 8–22)
CALCIUM SERPL-MCNC: 8.7 MG/DL (ref 8.5–10.5)
CHLORIDE SERPL-SCNC: 99 MMOL/L (ref 96–112)
CO2 SERPL-SCNC: 29 MMOL/L (ref 20–33)
CREAT SERPL-MCNC: 1.16 MG/DL (ref 0.5–1.4)
EOSINOPHIL # BLD AUTO: 0.02 K/UL (ref 0–0.51)
EOSINOPHIL NFR BLD: 0.2 % (ref 0–6.9)
ERYTHROCYTE [DISTWIDTH] IN BLOOD BY AUTOMATED COUNT: 44.9 FL (ref 35.9–50)
GFR SERPL CREATININE-BSD FRML MDRD: >60 ML/MIN/1.73 M 2
GLOBULIN SER CALC-MCNC: 2.2 G/DL (ref 1.9–3.5)
GLUCOSE BLD-MCNC: 117 MG/DL (ref 65–99)
GLUCOSE SERPL-MCNC: 107 MG/DL (ref 65–99)
HCT VFR BLD AUTO: 37.1 % (ref 42–52)
HGB BLD-MCNC: 13.1 G/DL (ref 14–18)
IMM GRANULOCYTES # BLD AUTO: 0.08 K/UL (ref 0–0.11)
IMM GRANULOCYTES NFR BLD AUTO: 0.8 % (ref 0–0.9)
LYMPHOCYTES # BLD AUTO: 1.76 K/UL (ref 1–4.8)
LYMPHOCYTES NFR BLD: 17.3 % (ref 22–41)
MAGNESIUM SERPL-MCNC: 1.7 MG/DL (ref 1.5–2.5)
MCH RBC QN AUTO: 34.7 PG (ref 27–33)
MCHC RBC AUTO-ENTMCNC: 35.3 G/DL (ref 33.7–35.3)
MCV RBC AUTO: 98.1 FL (ref 81.4–97.8)
MONOCYTES # BLD AUTO: 1.01 K/UL (ref 0–0.85)
MONOCYTES NFR BLD AUTO: 9.9 % (ref 0–13.4)
MYOGLOBIN SERPL-MCNC: 53 NG/ML (ref 0–110)
NEUTROPHILS # BLD AUTO: 7.3 K/UL (ref 1.82–7.42)
NEUTROPHILS NFR BLD: 71.7 % (ref 44–72)
NRBC # BLD AUTO: 0 K/UL
NRBC BLD AUTO-RTO: 0 /100 WBC
PHOSPHATE SERPL-MCNC: 3.8 MG/DL (ref 2.5–4.5)
PLATELET # BLD AUTO: 142 K/UL (ref 164–446)
PMV BLD AUTO: 11.9 FL (ref 9–12.9)
POTASSIUM SERPL-SCNC: 4.3 MMOL/L (ref 3.6–5.5)
PROT SERPL-MCNC: 5.9 G/DL (ref 6–8.2)
RBC # BLD AUTO: 3.78 M/UL (ref 4.7–6.1)
SODIUM SERPL-SCNC: 137 MMOL/L (ref 135–145)
WBC # BLD AUTO: 10.2 K/UL (ref 4.8–10.8)

## 2017-10-19 PROCEDURE — 700111 HCHG RX REV CODE 636 W/ 250 OVERRIDE (IP): Performed by: ANESTHESIOLOGY

## 2017-10-19 PROCEDURE — 99233 SBSQ HOSP IP/OBS HIGH 50: CPT | Performed by: SURGERY

## 2017-10-19 PROCEDURE — 82962 GLUCOSE BLOOD TEST: CPT

## 2017-10-19 PROCEDURE — 85025 COMPLETE CBC W/AUTO DIFF WBC: CPT

## 2017-10-19 PROCEDURE — A9270 NON-COVERED ITEM OR SERVICE: HCPCS | Performed by: SURGERY

## 2017-10-19 PROCEDURE — 80053 COMPREHEN METABOLIC PANEL: CPT

## 2017-10-19 PROCEDURE — 83735 ASSAY OF MAGNESIUM: CPT

## 2017-10-19 PROCEDURE — 700105 HCHG RX REV CODE 258: Performed by: ANESTHESIOLOGY

## 2017-10-19 PROCEDURE — 71010 DX-CHEST-PORTABLE (1 VIEW): CPT

## 2017-10-19 PROCEDURE — 700111 HCHG RX REV CODE 636 W/ 250 OVERRIDE (IP): Performed by: SURGERY

## 2017-10-19 PROCEDURE — 700102 HCHG RX REV CODE 250 W/ 637 OVERRIDE(OP): Performed by: SURGERY

## 2017-10-19 PROCEDURE — 84100 ASSAY OF PHOSPHORUS: CPT

## 2017-10-19 RX ADMIN — OXYCODONE HYDROCHLORIDE 10 MG: 10 TABLET ORAL at 04:08

## 2017-10-19 RX ADMIN — FAMOTIDINE 20 MG: 20 TABLET, FILM COATED ORAL at 08:29

## 2017-10-19 RX ADMIN — OXYCODONE HYDROCHLORIDE 10 MG: 10 TABLET ORAL at 08:29

## 2017-10-19 RX ADMIN — OXYCODONE HYDROCHLORIDE 10 MG: 10 TABLET ORAL at 13:35

## 2017-10-19 RX ADMIN — SODIUM BICARBONATE 150 MEQ: 84 INJECTION, SOLUTION INTRAVENOUS at 10:54

## 2017-10-19 RX ADMIN — HYDRALAZINE HYDROCHLORIDE 20 MG: 20 INJECTION INTRAMUSCULAR; INTRAVENOUS at 06:34

## 2017-10-19 ASSESSMENT — PAIN SCALES - GENERAL
PAINLEVEL_OUTOF10: 7
PAINLEVEL_OUTOF10: 0
PAINLEVEL_OUTOF10: 7
PAINLEVEL_OUTOF10: 0
PAINLEVEL_OUTOF10: 0
PAINLEVEL_OUTOF10: 7
PAINLEVEL_OUTOF10: 0
PAINLEVEL_OUTOF10: 7
PAINLEVEL_OUTOF10: 0
PAINLEVEL_OUTOF10: 6
PAINLEVEL_OUTOF10: 1

## 2017-10-19 ASSESSMENT — LIFESTYLE VARIABLES
PACK_YEARS: 20
EVER_SMOKED: YES

## 2017-10-19 NOTE — DISCHARGE INSTRUCTIONS
Discharge Instructions    Discharged to home by car with relative. Discharged via wheelchair, hospital escort: Refused.  Special equipment needed: Not Applicable    1. ACTIVITIES: Upon discharge from the hospital, the day of surgery it is requested that you do no significant physical activity and limit mental activities, as you have had sedation. The day after surgery, you may resume activities of daily living, but for four weeks, it is recommended that you do no strenuous activities or heavy lifting (greater than 15 pounds).     2. DRIVING: You may drive whenever you are off pain medications and are able to perform the activities needed to drive, i.e. turning, bending, twisting, etc.     3. WOUND: It is not unusual for patients to experience swelling and even bruising at the hernia repair site.     4. ICE: please use ice on the wound to decrease the swelling for the first 24 hours and then discontinue.     5. BATHING: The dressing can be removed two days after surgery and you may then allow the wound to get wet in a shower as normal, but avoid submersion in water (tub bath) for at least a week.     6. PAIN MEDICATION: You will be given a prescription for pain medication at discharge. Please take these as directed. It is important to remember not to take medications on an empty stomach as this may cause nausea.     7. BOWEL FUNCTION: After hernia repair, it is not uncommon for patients to experience constipation. This is due to decreasing activity levels as well as pain medications. You may wish to use a stool softener beginning immediately after surgery, and you may or may not need to use a laxative (Miralax, Milk of Magnesia, Ex-lax; Senokot, etc.) as well.            8. CALL IF YOU HAVE: (1) Fevers to more than 101.00 F, (2) Unusual chest or leg   pain, (3) Drainage or fluid from incision that may be foul smelling, increased tenderness or soreness at the wound or the wound edges are no longer together, redness or  swelling at the incision site. Please do not hesitate to call with any other questions.     9. APPOINTMENT: Contact our office at [phone] for a follow-up appointment in 1 to 2 weeks following your procedure.     If you have any additional questions, please do not hesitate to call the office and speak to either myself or the physician on call.     Office address:    [address], [address], [place] [address]     [facility]   [phone]    Be sure to schedule a follow-up appointment with your primary care doctor or any specialists as instructed.     Discharge Plan:   Diet Plan: Discussed  Activity Level: Discussed  Smoking Cessation Offered: Patient Refused  Confirmed Symptoms Management: Discussed  Medication Reconciliation Updated: Yes  Influenza Vaccine Indication: Patient Refuses    I understand that a diet low in cholesterol, fat, and sodium is recommended for good health. Unless I have been given specific instructions below for another diet, I accept this instruction as my diet prescription.   Other diet: Regular      Special Instructions: None    · Is patient discharged on Warfarin / Coumadin?   No     · Is patient Post Blood Transfusion?  No    Depression / Suicide Risk    As you are discharged from this [facility] facility, it is important to learn how to keep safe from harming yourself.    Recognize the warning signs:  · Abrupt changes in personality, positive or negative- including increase in energy   · Giving away possessions  · Change in eating patterns- significant weight changes-  positive or negative  · Change in sleeping patterns- unable to sleep or sleeping all the time   · Unwillingness or inability to communicate  · Depression  · Unusual sadness, discouragement and loneliness  · Talk of wanting to die  · Neglect of personal appearance   · Rebelliousness- reckless behavior  · Withdrawal from people/activities they love  · Confusion- inability to  concentrate     If you or a loved one observes any of these behaviors or has concerns about self-harm, here's what you can do:  · Talk about it- your feelings and reasons for harming yourself  · Remove any means that you might use to hurt yourself (examples: pills, rope, extension cords, firearm)  · Get professional help from the community (Mental Health, Substance Abuse, psychological counseling)  · Do not be alone:Call your Safe Contact- someone whom you trust who will be there for you.  · Call your local CRISIS HOTLINE 042-0893 or 635-597-4734  · Call your local Children's Mobile Crisis Response Team [place] (900) 770-0955 or www.DrAvailable  · Call the toll free National Suicide Prevention Hotlines   · National Suicide Prevention Lifeline 547-957-AJBE (5223)  · National Hope Line Network 800-SUICIDE (844-3911)

## 2017-10-19 NOTE — PROGRESS NOTES
Trauma/Surgical Progress Note    Author: Patel Martinez Date & Time created: 10/19/2017 3:56 PM      Interval Events:  Extubated yesterday afternoon  Hyperkalemia improved without need for insulin and glucose  Hemodynamics good    Hemodynamics:  Blood pressure 150/96, pulse 84, temperature 36.6 °C (97.8 °F), resp. rate (!) 55, height 1.829 m (6'), weight 95.6 kg (210 lb 12.2 oz), SpO2 95 %.     Respiratory:    Respiration: (!) 55, Pulse Oximetry: 95 %, O2 Daily Delivery Respiratory : Silicone Nasal Cannula        RUL Breath Sounds: Clear, RML Breath Sounds: Clear, RLL Breath Sounds: Clear, SHERWIN Breath Sounds: Clear, LLL Breath Sounds: Clear  Fluids:      Intake/Output Summary (Last 24 hours) at 10/19/17 1557  Last data filed at 10/19/17 1500   Gross per 24 hour   Intake           4248.7 ml   Output             5235 ml   Net           -986.3 ml       Admit Weight: 95.4 kg (210 lb 5.1 oz)  Current Weight: 95.6 kg (210 lb 12.2 oz)    Physical Exam   Constitutional: He appears well-developed and well-nourished.   HENT:   Head: Normocephalic and atraumatic.   Eyes: EOM are normal. Pupils are equal, round, and reactive to light.   Neck: Neck supple. No tracheal deviation present.   Cardiovascular: Normal rate and regular rhythm.    Pulmonary/Chest: Effort normal. No respiratory distress.   Abdominal: Soft.   Dressings c/d/i   Genitourinary:   Genitourinary Comments: Parry in place draining clear yellow urine   Musculoskeletal: Normal range of motion. He exhibits no edema.   Skin: Skin is warm and dry.   Psychiatric: He has a normal mood and affect. His behavior is normal.        Nursing note and vitals reviewed.      Medical Decision Making/Problem List:    Active Hospital Problems    Diagnosis   • Acute respiratory failure (CMS-HCC) [J96.00]     Priority: High     Brought from OR intubated during hyperthermic crisis  Extubated 10/18 pm     • Malignant hyperthermia [T88.3XXA]     Priority: High     Elevated CO2  intraoperatively  17 vials of Dantrolene given,  hotline called  Bicarb drip  CMP, ABG, myoglobin, CK every two hours  External cooling as needed  Resolved 10/19     • Hyperkalemia [E87.5]     Priority: High     Isolated hyperkalemia up to 6.6  EKG normal, asympotomatic, redraw postassium 6.0  Improved with time     • Recurrent hernia [K46.9]     Laparoscopic RIHR 10/18  Dr. Morgan, general surgery       Core Measures & Quality Metrics:  Labs reviewed, Medications reviewed and Radiology images reviewed  Parry catheter: Critically Ill - Requiring Accurate Measurement of Urinary Output      DVT Prophylaxis: Contraindicated - High bleeding risk  DVT prophylaxis - mechanical: SCDs  Ulcer prophylaxis: Yes      Plan:  Discharge home.  Patient counseled on the absolute importance of describing his episode of malignant hyperthermia to all physicians in the future      LETTY Score    Discussed patient condition with RN, RT and Pharmacy.    Critical care time: 36 minutes    Patel Martinez MD  [phone]

## 2017-10-19 NOTE — CARE PLAN
Problem: Safety  Goal: Will remain free from injury  Outcome: PROGRESSING AS EXPECTED  Patient educated not to get out of bed without assistance of staff. Patient states he understands and will call for help first. Bed alarm set and call light within reach.     Problem: Pain Management  Goal: Pain level will decrease to patient's comfort goal  Outcome: PROGRESSING AS EXPECTED  Pain assessed q2 hours. PRN medication administered per MD order per MAR. Nonpharmacologic comfort measures implemented such as repositioning, rest, extra pillows, emotional support, and distraction with TV.

## 2017-10-19 NOTE — CARE PLAN
Problem: Oxygenation:  Goal: Maintain adequate oxygenation dependent on patient condition  Outcome: PROGRESSING AS EXPECTED  Received on the vent from OR. PT was extubated 16:00, no respiratory distress. Placed on 2L NC with humidity.

## 2017-10-19 NOTE — PROGRESS NOTES
Call received from Dr. Martinez. This RN clarified orders for q2 labs ordered. Per Dr. Martinez, discontinue orders for q2 CMP, ABG, creatine kinase, and myoglobin at this time. Received order to check CMP and CBC tomorrow morning. Orders implemented.

## 2017-10-19 NOTE — CARE PLAN
Problem: Pain Management  Goal: Pain level will decrease to patient's comfort goal    Intervention: Follow pain managment plan developed in collaboration with patient and Interdisciplinary Team  Patient c/o pain at surgical incision and chest.  Patient states relief from PRN Dilaudid.       Problem: Hemodynamic Status  Goal: Vital Signs and Fluid Balance Management    Intervention: Monitor I & O, Manage IV fluids and IV infusions  Q 2 hour CMP, Myoglobin, and CPK.  Closely following potassium.  Continued discussion/colaboration with  hotline.

## 2017-10-19 NOTE — PROGRESS NOTES
Patient was seen and examined.  Feels well. Vital signs and labs reviewed.  Counseled patient on diet, actively level and progress this far.  Questions answered.  Appreciate Dr. Martinez and SICU team and Dr. Dennis. Jayna for home later to day from our perspective. Counseled regarding MH.

## 2017-10-26 NOTE — ADDENDUM NOTE
Encounter addended by: Melania Dyer R.N. on: 10/26/2017 12:19 PM<BR>    Actions taken: Flowsheet accepted

## 2021-02-12 ENCOUNTER — HOSPITAL ENCOUNTER (OUTPATIENT)
Dept: LAB | Facility: MEDICAL CENTER | Age: 56
End: 2021-02-12
Attending: OTOLARYNGOLOGY
Payer: MEDICAID

## 2021-02-12 LAB
COVID ORDER STATUS COVID19: NORMAL
SARS-COV-2 RNA RESP QL NAA+PROBE: NOTDETECTED
SPECIMEN SOURCE: NORMAL

## 2021-02-12 PROCEDURE — C9803 HOPD COVID-19 SPEC COLLECT: HCPCS

## 2021-02-12 PROCEDURE — U0005 INFEC AGEN DETEC AMPLI PROBE: HCPCS

## 2021-02-12 PROCEDURE — U0003 INFECTIOUS AGENT DETECTION BY NUCLEIC ACID (DNA OR RNA); SEVERE ACUTE RESPIRATORY SYNDROME CORONAVIRUS 2 (SARS-COV-2) (CORONAVIRUS DISEASE [COVID-19]), AMPLIFIED PROBE TECHNIQUE, MAKING USE OF HIGH THROUGHPUT TECHNOLOGIES AS DESCRIBED BY CMS-2020-01-R: HCPCS

## 2025-06-29 ENCOUNTER — APPOINTMENT (OUTPATIENT)
Dept: RADIOLOGY | Facility: MEDICAL CENTER | Age: 60
DRG: 682 | End: 2025-06-29
Attending: STUDENT IN AN ORGANIZED HEALTH CARE EDUCATION/TRAINING PROGRAM
Payer: MEDICAID

## 2025-06-29 ENCOUNTER — HOSPITAL ENCOUNTER (INPATIENT)
Facility: MEDICAL CENTER | Age: 60
End: 2025-06-29
Attending: STUDENT IN AN ORGANIZED HEALTH CARE EDUCATION/TRAINING PROGRAM | Admitting: HOSPITALIST
Payer: MEDICAID

## 2025-06-29 DIAGNOSIS — R27.0 ATAXIA: ICD-10-CM

## 2025-06-29 DIAGNOSIS — I16.1 HYPERTENSIVE EMERGENCY: Primary | ICD-10-CM

## 2025-06-29 DIAGNOSIS — I15.9 SECONDARY HYPERTENSION: ICD-10-CM

## 2025-06-29 DIAGNOSIS — R47.89 WORD FINDING DIFFICULTY: ICD-10-CM

## 2025-06-29 DIAGNOSIS — R79.89 ELEVATED TROPONIN: ICD-10-CM

## 2025-06-29 PROBLEM — I10 HTN (HYPERTENSION): Status: ACTIVE | Noted: 2025-06-29

## 2025-06-29 PROBLEM — N17.9 ACUTE KIDNEY INJURY (HCC): Status: ACTIVE | Noted: 2025-06-29

## 2025-06-29 PROBLEM — Z72.0 TOBACCO ABUSE: Status: ACTIVE | Noted: 2025-06-29

## 2025-06-29 PROBLEM — R41.0 CONFUSION: Status: ACTIVE | Noted: 2025-06-29

## 2025-06-29 PROBLEM — F10.10 ALCOHOL ABUSE: Status: ACTIVE | Noted: 2025-06-29

## 2025-06-29 PROBLEM — F15.10 METHAMPHETAMINE ABUSE (HCC): Status: ACTIVE | Noted: 2025-06-29

## 2025-06-29 LAB
ABO GROUP BLD: NORMAL
ALBUMIN SERPL BCP-MCNC: 4.7 G/DL (ref 3.2–4.9)
ALBUMIN/GLOB SERPL: 1.6 G/DL
ALP SERPL-CCNC: 88 U/L (ref 30–99)
ALT SERPL-CCNC: 16 U/L (ref 2–50)
AMPHET UR QL SCN: POSITIVE
ANION GAP SERPL CALC-SCNC: 18 MMOL/L (ref 7–16)
APTT PPP: 26.6 SEC (ref 24.7–36)
AST SERPL-CCNC: 31 U/L (ref 12–45)
BARBITURATES UR QL SCN: NEGATIVE
BASOPHILS # BLD AUTO: 0.8 % (ref 0–1.8)
BASOPHILS # BLD: 0.05 K/UL (ref 0–0.12)
BENZODIAZ UR QL SCN: NEGATIVE
BILIRUB SERPL-MCNC: 0.5 MG/DL (ref 0.1–1.5)
BLD GP AB SCN SERPL QL: NORMAL
BUN SERPL-MCNC: 23 MG/DL (ref 8–22)
BZE UR QL SCN: NEGATIVE
CALCIUM ALBUM COR SERPL-MCNC: 9.1 MG/DL (ref 8.5–10.5)
CALCIUM SERPL-MCNC: 9.7 MG/DL (ref 8.5–10.5)
CANNABINOIDS UR QL SCN: POSITIVE
CHLORIDE SERPL-SCNC: 100 MMOL/L (ref 96–112)
CO2 SERPL-SCNC: 23 MMOL/L (ref 20–33)
CREAT SERPL-MCNC: 1.98 MG/DL (ref 0.5–1.4)
EKG IMPRESSION: NORMAL
EOSINOPHIL # BLD AUTO: 0.17 K/UL (ref 0–0.51)
EOSINOPHIL NFR BLD: 2.6 % (ref 0–6.9)
ERYTHROCYTE [DISTWIDTH] IN BLOOD BY AUTOMATED COUNT: 45.5 FL (ref 35.9–50)
EST. AVERAGE GLUCOSE BLD GHB EST-MCNC: 94 MG/DL
FENTANYL UR QL: POSITIVE
GFR SERPLBLD CREATININE-BSD FMLA CKD-EPI: 38 ML/MIN/1.73 M 2
GLOBULIN SER CALC-MCNC: 3 G/DL (ref 1.9–3.5)
GLUCOSE BLD STRIP.AUTO-MCNC: 84 MG/DL (ref 65–99)
GLUCOSE SERPL-MCNC: 80 MG/DL (ref 65–99)
HBA1C MFR BLD: 4.9 % (ref 4–5.6)
HCT VFR BLD AUTO: 32.5 % (ref 42–52)
HGB BLD-MCNC: 11 G/DL (ref 14–18)
IMM GRANULOCYTES # BLD AUTO: 0.02 K/UL (ref 0–0.11)
IMM GRANULOCYTES NFR BLD AUTO: 0.3 % (ref 0–0.9)
INR PPP: 1 (ref 0.87–1.13)
LYMPHOCYTES # BLD AUTO: 1.71 K/UL (ref 1–4.8)
LYMPHOCYTES NFR BLD: 26.6 % (ref 22–41)
MAGNESIUM SERPL-MCNC: 0.9 MG/DL (ref 1.5–2.5)
MCH RBC QN AUTO: 31.7 PG (ref 27–33)
MCHC RBC AUTO-ENTMCNC: 33.8 G/DL (ref 32.3–36.5)
MCV RBC AUTO: 93.7 FL (ref 81.4–97.8)
METHADONE UR QL SCN: NEGATIVE
MONOCYTES # BLD AUTO: 0.43 K/UL (ref 0–0.85)
MONOCYTES NFR BLD AUTO: 6.7 % (ref 0–13.4)
NEUTROPHILS # BLD AUTO: 4.04 K/UL (ref 1.82–7.42)
NEUTROPHILS NFR BLD: 63 % (ref 44–72)
NRBC # BLD AUTO: 0 K/UL
NRBC BLD-RTO: 0 /100 WBC (ref 0–0.2)
OPIATES UR QL SCN: NEGATIVE
OXYCODONE UR QL SCN: NEGATIVE
PCP UR QL SCN: NEGATIVE
PHOSPHATE SERPL-MCNC: 3.2 MG/DL (ref 2.5–4.5)
PLATELET # BLD AUTO: 122 K/UL (ref 164–446)
PMV BLD AUTO: 10.3 FL (ref 9–12.9)
POTASSIUM SERPL-SCNC: 4 MMOL/L (ref 3.6–5.5)
PROPOXYPH UR QL SCN: NEGATIVE
PROT SERPL-MCNC: 7.7 G/DL (ref 6–8.2)
PROTHROMBIN TIME: 13.2 SEC (ref 12–14.6)
RBC # BLD AUTO: 3.47 M/UL (ref 4.7–6.1)
RH BLD: NORMAL
SODIUM SERPL-SCNC: 141 MMOL/L (ref 135–145)
TROPONIN T SERPL-MCNC: 36 NG/L (ref 6–19)
WBC # BLD AUTO: 6.4 K/UL (ref 4.8–10.8)

## 2025-06-29 PROCEDURE — 36415 COLL VENOUS BLD VENIPUNCTURE: CPT

## 2025-06-29 PROCEDURE — 82962 GLUCOSE BLOOD TEST: CPT

## 2025-06-29 PROCEDURE — 83036 HEMOGLOBIN GLYCOSYLATED A1C: CPT

## 2025-06-29 PROCEDURE — 70498 CT ANGIOGRAPHY NECK: CPT

## 2025-06-29 PROCEDURE — 84484 ASSAY OF TROPONIN QUANT: CPT

## 2025-06-29 PROCEDURE — 85730 THROMBOPLASTIN TIME PARTIAL: CPT

## 2025-06-29 PROCEDURE — A9270 NON-COVERED ITEM OR SERVICE: HCPCS | Performed by: HOSPITALIST

## 2025-06-29 PROCEDURE — 770020 HCHG ROOM/CARE - TELE (206)

## 2025-06-29 PROCEDURE — 86900 BLOOD TYPING SEROLOGIC ABO: CPT

## 2025-06-29 PROCEDURE — 700111 HCHG RX REV CODE 636 W/ 250 OVERRIDE (IP): Mod: UD | Performed by: STUDENT IN AN ORGANIZED HEALTH CARE EDUCATION/TRAINING PROGRAM

## 2025-06-29 PROCEDURE — 93005 ELECTROCARDIOGRAM TRACING: CPT | Mod: TC | Performed by: STUDENT IN AN ORGANIZED HEALTH CARE EDUCATION/TRAINING PROGRAM

## 2025-06-29 PROCEDURE — 94760 N-INVAS EAR/PLS OXIMETRY 1: CPT

## 2025-06-29 PROCEDURE — 70450 CT HEAD/BRAIN W/O DYE: CPT

## 2025-06-29 PROCEDURE — 83735 ASSAY OF MAGNESIUM: CPT

## 2025-06-29 PROCEDURE — 80053 COMPREHEN METABOLIC PANEL: CPT

## 2025-06-29 PROCEDURE — 700102 HCHG RX REV CODE 250 W/ 637 OVERRIDE(OP): Performed by: HOSPITALIST

## 2025-06-29 PROCEDURE — 84100 ASSAY OF PHOSPHORUS: CPT

## 2025-06-29 PROCEDURE — 700111 HCHG RX REV CODE 636 W/ 250 OVERRIDE (IP): Performed by: HOSPITALIST

## 2025-06-29 PROCEDURE — 99285 EMERGENCY DEPT VISIT HI MDM: CPT

## 2025-06-29 PROCEDURE — 85610 PROTHROMBIN TIME: CPT

## 2025-06-29 PROCEDURE — 86850 RBC ANTIBODY SCREEN: CPT

## 2025-06-29 PROCEDURE — 85025 COMPLETE CBC W/AUTO DIFF WBC: CPT

## 2025-06-29 PROCEDURE — 0042T CT-CEREBRAL PERFUSION ANALYSIS: CPT

## 2025-06-29 PROCEDURE — 71045 X-RAY EXAM CHEST 1 VIEW: CPT

## 2025-06-29 PROCEDURE — HZ2ZZZZ DETOXIFICATION SERVICES FOR SUBSTANCE ABUSE TREATMENT: ICD-10-PCS | Performed by: HOSPITALIST

## 2025-06-29 PROCEDURE — 80307 DRUG TEST PRSMV CHEM ANLYZR: CPT

## 2025-06-29 PROCEDURE — 99223 1ST HOSP IP/OBS HIGH 75: CPT | Performed by: HOSPITALIST

## 2025-06-29 PROCEDURE — 700105 HCHG RX REV CODE 258: Mod: UD | Performed by: STUDENT IN AN ORGANIZED HEALTH CARE EDUCATION/TRAINING PROGRAM

## 2025-06-29 PROCEDURE — 700117 HCHG RX CONTRAST REV CODE 255: Mod: UD | Performed by: STUDENT IN AN ORGANIZED HEALTH CARE EDUCATION/TRAINING PROGRAM

## 2025-06-29 PROCEDURE — 96374 THER/PROPH/DIAG INJ IV PUSH: CPT

## 2025-06-29 PROCEDURE — 70496 CT ANGIOGRAPHY HEAD: CPT

## 2025-06-29 PROCEDURE — 86901 BLOOD TYPING SEROLOGIC RH(D): CPT

## 2025-06-29 PROCEDURE — 700105 HCHG RX REV CODE 258: Performed by: HOSPITALIST

## 2025-06-29 RX ORDER — SODIUM CHLORIDE 9 MG/ML
1000 INJECTION, SOLUTION INTRAVENOUS ONCE
Status: COMPLETED | OUTPATIENT
Start: 2025-06-29 | End: 2025-06-29

## 2025-06-29 RX ORDER — FOLIC ACID 1 MG/1
1 TABLET ORAL DAILY
COMMUNITY

## 2025-06-29 RX ORDER — LORAZEPAM 0.5 MG/1
0.5 TABLET ORAL EVERY 4 HOURS PRN
Status: DISCONTINUED | OUTPATIENT
Start: 2025-06-29 | End: 2025-07-01 | Stop reason: HOSPADM

## 2025-06-29 RX ORDER — TAMSULOSIN HYDROCHLORIDE 0.4 MG/1
0.4 CAPSULE ORAL DAILY
COMMUNITY

## 2025-06-29 RX ORDER — GAUZE BANDAGE 2" X 2"
100 BANDAGE TOPICAL DAILY
Status: DISCONTINUED | OUTPATIENT
Start: 2025-06-29 | End: 2025-07-01 | Stop reason: HOSPADM

## 2025-06-29 RX ORDER — LORAZEPAM 2 MG/1
4 TABLET ORAL
Status: DISCONTINUED | OUTPATIENT
Start: 2025-06-29 | End: 2025-07-01 | Stop reason: HOSPADM

## 2025-06-29 RX ORDER — LABETALOL HYDROCHLORIDE 5 MG/ML
10-20 INJECTION, SOLUTION INTRAVENOUS EVERY 4 HOURS PRN
Status: DISCONTINUED | OUTPATIENT
Start: 2025-06-29 | End: 2025-07-01 | Stop reason: HOSPADM

## 2025-06-29 RX ORDER — HEPARIN SODIUM 5000 [USP'U]/ML
5000 INJECTION, SOLUTION INTRAVENOUS; SUBCUTANEOUS EVERY 8 HOURS
Status: DISCONTINUED | OUTPATIENT
Start: 2025-06-29 | End: 2025-07-01 | Stop reason: HOSPADM

## 2025-06-29 RX ORDER — ASPIRIN 81 MG/1
81 TABLET ORAL DAILY
Status: DISCONTINUED | OUTPATIENT
Start: 2025-06-29 | End: 2025-07-01 | Stop reason: HOSPADM

## 2025-06-29 RX ORDER — DIAZEPAM 10 MG/2ML
10 INJECTION, SOLUTION INTRAMUSCULAR; INTRAVENOUS
Status: DISCONTINUED | OUTPATIENT
Start: 2025-06-29 | End: 2025-07-01 | Stop reason: HOSPADM

## 2025-06-29 RX ORDER — FOLIC ACID 1 MG/1
1 TABLET ORAL DAILY
Status: DISCONTINUED | OUTPATIENT
Start: 2025-06-29 | End: 2025-07-01 | Stop reason: HOSPADM

## 2025-06-29 RX ORDER — AMLODIPINE BESYLATE 5 MG/1
5 TABLET ORAL
Status: DISCONTINUED | OUTPATIENT
Start: 2025-06-29 | End: 2025-06-30

## 2025-06-29 RX ORDER — LORAZEPAM 2 MG/1
2 TABLET ORAL
Status: DISCONTINUED | OUTPATIENT
Start: 2025-06-29 | End: 2025-07-01 | Stop reason: HOSPADM

## 2025-06-29 RX ORDER — POTASSIUM CHLORIDE 750 MG/1
10 TABLET, EXTENDED RELEASE ORAL DAILY
COMMUNITY

## 2025-06-29 RX ORDER — THIAMINE MONONITRATE (VIT B1) 100 MG
100 TABLET ORAL DAILY
COMMUNITY

## 2025-06-29 RX ORDER — TRAZODONE HYDROCHLORIDE 100 MG/1
100 TABLET ORAL NIGHTLY
COMMUNITY

## 2025-06-29 RX ORDER — ALLOPURINOL 100 MG/1
100 TABLET ORAL DAILY
COMMUNITY

## 2025-06-29 RX ORDER — ATORVASTATIN CALCIUM 20 MG/1
20 TABLET, FILM COATED ORAL NIGHTLY
COMMUNITY

## 2025-06-29 RX ORDER — LORAZEPAM 1 MG/1
1 TABLET ORAL EVERY 4 HOURS PRN
Status: DISCONTINUED | OUTPATIENT
Start: 2025-06-29 | End: 2025-07-01 | Stop reason: HOSPADM

## 2025-06-29 RX ORDER — LABETALOL HYDROCHLORIDE 5 MG/ML
10 INJECTION, SOLUTION INTRAVENOUS ONCE
Status: COMPLETED | OUTPATIENT
Start: 2025-06-29 | End: 2025-06-29

## 2025-06-29 RX ORDER — SODIUM CHLORIDE 9 MG/ML
30 INJECTION, SOLUTION INTRAVENOUS ONCE
Status: DISCONTINUED | OUTPATIENT
Start: 2025-06-29 | End: 2025-06-29

## 2025-06-29 RX ORDER — SODIUM CHLORIDE, SODIUM LACTATE, POTASSIUM CHLORIDE, CALCIUM CHLORIDE 600; 310; 30; 20 MG/100ML; MG/100ML; MG/100ML; MG/100ML
INJECTION, SOLUTION INTRAVENOUS CONTINUOUS
Status: DISCONTINUED | OUTPATIENT
Start: 2025-06-29 | End: 2025-07-01

## 2025-06-29 RX ORDER — GABAPENTIN 300 MG/1
300 CAPSULE ORAL 2 TIMES DAILY PRN
COMMUNITY

## 2025-06-29 RX ORDER — NALTREXONE HYDROCHLORIDE 50 MG/1
50 TABLET, FILM COATED ORAL DAILY
COMMUNITY

## 2025-06-29 RX ORDER — MAGNESIUM SULFATE HEPTAHYDRATE 40 MG/ML
4 INJECTION, SOLUTION INTRAVENOUS ONCE
Status: COMPLETED | OUTPATIENT
Start: 2025-06-29 | End: 2025-06-29

## 2025-06-29 RX ORDER — OMEPRAZOLE 20 MG/1
20 CAPSULE, DELAYED RELEASE ORAL DAILY
COMMUNITY

## 2025-06-29 RX ORDER — NICOTINE 21 MG/24HR
21 PATCH, TRANSDERMAL 24 HOURS TRANSDERMAL
Status: DISCONTINUED | OUTPATIENT
Start: 2025-06-29 | End: 2025-06-30

## 2025-06-29 RX ORDER — ACETAMINOPHEN 325 MG/1
650 TABLET ORAL EVERY 6 HOURS PRN
Status: DISCONTINUED | OUTPATIENT
Start: 2025-06-29 | End: 2025-07-01 | Stop reason: HOSPADM

## 2025-06-29 RX ADMIN — HEPARIN SODIUM 5000 UNITS: 5000 INJECTION, SOLUTION INTRAVENOUS; SUBCUTANEOUS at 15:52

## 2025-06-29 RX ADMIN — IOHEXOL 80 ML: 350 INJECTION, SOLUTION INTRAVENOUS at 14:00

## 2025-06-29 RX ADMIN — LABETALOL HYDROCHLORIDE 10 MG: 5 INJECTION, SOLUTION INTRAVENOUS at 14:30

## 2025-06-29 RX ADMIN — SODIUM CHLORIDE 1000 ML: 9 INJECTION, SOLUTION INTRAVENOUS at 14:30

## 2025-06-29 RX ADMIN — FOLIC ACID 1 MG: 1 TABLET ORAL at 15:50

## 2025-06-29 RX ADMIN — THERA TABS 1 TABLET: TAB at 15:50

## 2025-06-29 RX ADMIN — ASPIRIN 81 MG: 81 TABLET, COATED ORAL at 15:50

## 2025-06-29 RX ADMIN — Medication 100 MG: at 15:50

## 2025-06-29 RX ADMIN — NICOTINE TRANSDERMAL SYSTEM 21 MG: 21 PATCH, EXTENDED RELEASE TRANSDERMAL at 17:58

## 2025-06-29 RX ADMIN — HEPARIN SODIUM 5000 UNITS: 5000 INJECTION, SOLUTION INTRAVENOUS; SUBCUTANEOUS at 21:56

## 2025-06-29 RX ADMIN — SODIUM CHLORIDE, POTASSIUM CHLORIDE, SODIUM LACTATE AND CALCIUM CHLORIDE: 600; 310; 30; 20 INJECTION, SOLUTION INTRAVENOUS at 16:21

## 2025-06-29 RX ADMIN — AMLODIPINE BESYLATE 5 MG: 5 TABLET ORAL at 15:51

## 2025-06-29 RX ADMIN — MAGNESIUM SULFATE HEPTAHYDRATE 4 G: 4 INJECTION, SOLUTION INTRAVENOUS at 17:57

## 2025-06-29 RX ADMIN — IOHEXOL 40 ML: 350 INJECTION, SOLUTION INTRAVENOUS at 14:00

## 2025-06-29 ASSESSMENT — LIFESTYLE VARIABLES
VISUAL DISTURBANCES: NOT PRESENT
AGITATION: NORMAL ACTIVITY
ANXIETY: NO ANXIETY (AT EASE)
TREMOR: NO TREMOR
ORIENTATION AND CLOUDING OF SENSORIUM: ORIENTED AND CAN DO SERIAL ADDITIONS
ORIENTATION AND CLOUDING OF SENSORIUM: ORIENTED AND CAN DO SERIAL ADDITIONS
TOTAL SCORE: 0
HEADACHE, FULLNESS IN HEAD: NOT PRESENT
PAROXYSMAL SWEATS: NO SWEAT VISIBLE
VISUAL DISTURBANCES: NOT PRESENT
AUDITORY DISTURBANCES: NOT PRESENT
HEADACHE, FULLNESS IN HEAD: NOT PRESENT
AUDITORY DISTURBANCES: NOT PRESENT
ANXIETY: NO ANXIETY (AT EASE)
PAROXYSMAL SWEATS: NO SWEAT VISIBLE
AGITATION: NORMAL ACTIVITY
NAUSEA AND VOMITING: NO NAUSEA AND NO VOMITING
TREMOR: NO TREMOR
TOTAL SCORE: 0
SUBSTANCE_ABUSE: 1
NAUSEA AND VOMITING: NO NAUSEA AND NO VOMITING

## 2025-06-29 ASSESSMENT — FIBROSIS 4 INDEX
FIB4 SCORE: 3.81
FIB4 SCORE: 3.81

## 2025-06-29 ASSESSMENT — ENCOUNTER SYMPTOMS
TREMORS: 1
SENSORY CHANGE: 1
BLURRED VISION: 1

## 2025-06-29 ASSESSMENT — PAIN DESCRIPTION - PAIN TYPE
TYPE: ACUTE PAIN
TYPE: ACUTE PAIN

## 2025-06-29 NOTE — ASSESSMENT & PLAN NOTE
Patient is on lisinopril as an outpatient, will hold considering patient CHRISTINE.    Was started amlodipine 5, will  increase to 10

## 2025-06-29 NOTE — ED PROVIDER NOTES
"ED Provider Note    CHIEF COMPLAINT  Chief Complaint   Patient presents with    Possible Stroke     Pt c/o feeling \"confused\".  Pt states he got locked out of his phone because he could not hit the correct numbers on his phone around approximately 7am this morning. Blood sugar 84. Pt A&O x4 in triage, equal  strengths but appears ataxic with the left side when asked to touch pt's nose then this RN's finger. Charge RN notified for stroke assessment.        EXTERNAL RECORDS REVIEWED  Inpatient Notes admission on 9/15/2021 for patient with acute kidney failure, hyperlipidemia, hypertension    HPI/ROS  LIMITATION TO HISTORY   Select: : None  OUTSIDE HISTORIAN(S):    Josh Jacob is a 60 y.o. male who presents with word finding difficulty, inability to use his phone, difficulty walking, shaking of the extremities, confusion, blurry vision, nausea that started at 7 AM this morning.  Patient denies abdominal pain.  Patient does endorse some diarrhea.  Patient denies chest pain.    PAST MEDICAL HISTORY   has a past medical history of Hypertension.    SURGICAL HISTORY   has a past surgical history that includes laparoscopic inguinal hernia repair (Right, 10/18/2017).    FAMILY HISTORY  History reviewed. No pertinent family history.    SOCIAL HISTORY  Social History     Tobacco Use    Smoking status: Every Day     Current packs/day: 1.00     Average packs/day: 1 pack/day for 25.0 years (25.0 ttl pk-yrs)     Types: Cigarettes    Smokeless tobacco: Never   Substance and Sexual Activity    Alcohol use: Yes     Comment: daily drinker    Drug use: Yes     Comment: former meth user    Sexual activity: Not on file       CURRENT MEDICATIONS  Home Medications       Reviewed by Divine Zhou (Pharmacy Tech) on 06/29/25 at 1528  Med List Status: Complete     Medication Last Dose Status   allopurinol (ZYLOPRIM) 100 MG Tab 6/29/2025 Active   atorvastatin (LIPITOR) 20 MG Tab 6/28/2025 Active   folic acid (FOLVITE) 1 MG Tab " 6/29/2025 Active   gabapentin (NEURONTIN) 300 MG Cap Unknown Active   hydrOXYzine HCl (ATARAX) 50 MG Tab Unknown Active   levothyroxine (SYNTHROID) 50 MCG Tab 6/29/2025 Active   lisinopril (PRINIVIL) 20 MG Tab 6/29/2025 Active   naltrexone (DEPADE) 50 MG Tab 6/29/2025 Active   omeprazole (PRILOSEC) 20 MG delayed-release capsule 6/29/2025 Active   potassium chloride SA (K-DUR) 10 MEQ Tab CR 6/29/2025 Active   tamsulosin (FLOMAX) 0.4 MG capsule 6/29/2025 Active   thiamine (VITAMIN B-1) 100 MG Tab 6/29/2025 Active   tizanidine (ZANAFLEX) 4 MG Tab Unknown Active   traZODone (DESYREL) 100 MG Tab 6/28/2025 Active                  Audit from Redirected Encounters    **Home medications have not yet been reviewed for this encounter**         ALLERGIES  Allergies[1]    PHYSICAL EXAM  VITAL SIGNS: BP (!) 140/85   Pulse 73   Temp 36.6 °C (97.8 °F) (Temporal)   Resp 20   Ht 1.829 m (6')   Wt 90.4 kg (199 lb 4.7 oz)   SpO2 100%   BMI 27.03 kg/m²    Vitals and nursing note reviewed.   Constitutional:       Comments: Patient is lying in bed supine, pleasant, conversant, speaking in complete sentences   HENT:      Head: Normocephalic and atraumatic.   Eyes:      Extraocular Movements: Extraocular movements intact.      Conjunctiva/sclera: Conjunctivae normal.      Pupils: Pupils are equal, round, and reactive to light.   Cardiovascular:      Pulses: Normal pulses.      Comments: HR 90  Pulmonary:      Effort: Pulmonary effort is normal. No respiratory distress.   Musculoskeletal:         General: No swelling. Normal range of motion.      Cervical back: Normal range of motion. No rigidity.   Skin:     General: Skin is warm and dry.      Capillary Refill: Capillary refill takes less than 2 seconds.   Neurological:      Mental Status: Alert. CN II-XII intact, speech normal, motor strength 5/5 in all four extremities, normal  strength bilaterally, sensation to light touch grossly intact in all extremities, no pronator drift,  AO x3, difficulty ambulating, bilateral finger-nose dysmetria, horizontal and vertical nystagmus        EKG/LABS  No acute ischemic changes  I have independently interpreted this EKG    RADIOLOGY/PROCEDURES   I have independently interpreted the diagnostic imaging associated with this visit and am waiting the final reading from the radiologist.   My preliminary interpretation is as follows: No intracranial hemorrhage    Radiologist interpretation:  DX-CHEST-PORTABLE (1 VIEW)   Final Result      No acute cardiopulmonary abnormality.         CT-CTA NECK WITH & W/O-POST PROCESSING   Final Result      1.  No high-grade stenosis, large vessel occlusion, aneurysm or dissection.   2.  A 5 mm nodule in the left upper lobe. Follow-up guidelines for high and low risk patients are outlined below.      Pulmonary nodule guidelines:      Low Risk: No routine follow-up      High Risk: Optional CT at 12 months      Comments: Nodules less than 6 mm do not require routine follow-up, but certain patients at high risk with suspicious nodule morphology, upper lobe location, or both may warrant 12-month follow-up.      Low Risk - Minimal or absent history of smoking and of other known risk factors.      High Risk - History of smoking or of other known risk factors.      Note: These recommendations do not apply to lung cancer screening, patients with immunosuppression, or patients with known primary cancer.      Fleischner Society 2017 Guidelines for Management of Incidentally Detected Pulmonary Nodules in Adults         CT-CTA HEAD WITH & W/O-POST PROCESS   Final Result      1.  No large vessel occlusion, high-grade stenosis or aneurysm of the [place] of [place].   2.  No CT evidence of acute infarct, hemorrhage or mass.      CT-CEREBRAL PERFUSION ANALYSIS   Final Result      1. Cerebral blood flow less than 30% possibly representing completed infarct = 0 mL. Based on distribution of this finding, this is unlikely to represent artifact.     "  2. T Max more than 6 seconds possibly representing combination of completed infarct and ischemia = 0 mL. Based on the distribution of this finding, this is unlikely to represent artifact.      3. Mismatched volume possibly representing ischemic brain/penumbra= 0 mL      4.  Please note that this cerebral perfusion study and report is Quantitative and targets supratentorial (cerebral) perfusion for evaluation of large vessel territory acute ischemia/infarction. For example, lacunar infarcts, and brainstem/posterior fossa    ischemia/infarction are not evaluated on this study.  Data acquisition is subject to artifacts which can yield non-anatomically plausible perfusion maps which may be due to motion, bolus timing, signal to noise ratio, or other technical factors.    Perfusion map abnormalities which show non-anatomic distributions are likely artifact.   This study is not \"stand-alone\" and should only be utilized for diagnosis, management/treatment in correlation with CT, CTA, and/or MRI and clinical factors.         CT-HEAD W/O   Final Result      1.  No acute intracranial abnormality.   2.  Mild diffuse atrophy.   3.  RIGHT mastoid fluid, likely inflammatory.                  MR-BRAIN-W/O    (Results Pending)       COURSE & MEDICAL DECISION MAKING    ASSESSMENT, COURSE AND PLAN  Care Narrative: Stroke workup ordered.  Stroke activation called.  Neurology to evaluate the patient emergently.  I am concerned about a posterior circulation CVA.  Patient has nausea, vision changes, ataxia, word finding difficulty.  Disposition pending imaging, neurology evaluation.    Electronically signed by: Ulises Viera M.D., 6/29/2025 1:22 PM    No large vessel occlusion, intracranial hemorrhage, intracranial mass on CT imaging.  Troponin elevated, should be trended as an inpatient, patient with acute kidney injury on lab work.  Neurology has come to the bedside and would like MRI as an inpatient and blood pressure " management.  They believe patient's recent sympathomimetic drug use could be related to hypertensive emergency with endorgan damage manifesting as ataxia and altered mental status.  Patient has been giving emergent IV and hypertensives for acute management.  Disposition to the hospital medicine service.    This dictation has been created using voice recognition software. I am continuously working with the software to minimize the number of voice recognition errors and I have made every attempt to manually correct the errors within my dictation. However errors  related to this voice recognition software may still exist and should be interpreted within the appropriate context.     Electronically signed by: Ulises Viera M.D., 6/29/2025 6:23 PM    CRITICAL CARE  The very real possibilty of a deterioration of this patient's condition required the highest level of my preparedness for sudden, emergent intervention.  I provided critical care services, which included medication orders, frequent reevaluations of the patient's condition and response to treatment, ordering and reviewing test results, and discussing the case with various consultants.  The critical care time associated with the care of the patient was 36 minutes. Review chart for interventions. This time is exclusive of any other billable procedures.       HEART SCORE:  History - 0  EKG - 0  Age - 1  Risk Factors - 1  Initial Troponin - 1  Total - 3      Hydration: Based on the patient's presentation of Dehydration the patient was given IV fluids. IV Hydration was used because oral hydration was not adequate alone. Upon recheck following hydration, the patient was improved.      DISPOSITION AND DISCUSSIONS  I have discussed management of the patient with the following physicians and TERI's:  Dr Schneider    FINAL DIAGNOSIS  1. Hypertensive emergency    2. Ataxia    3. Word finding difficulty    4. Elevated troponin         Electronically signed by:  Ulises Viera M.D., 6/29/2025 1:21 PM           [1]   Allergies  Allergen Reactions    Succinylcholine      Malignant hyperthermia

## 2025-06-29 NOTE — ASSESSMENT & PLAN NOTE
Upon review of the records his baseline creatinine is 1 and currently is 2  Clinically he appears to be dehydrated perhaps exacerbated by methamphetamine abuse  Continue ivf  I/Os   Bladder scan , Urine Na and Urine creatinine

## 2025-06-29 NOTE — ASSESSMENT & PLAN NOTE
He endorses drinking a pint of vodka daily  He is at significant risk of detox  Continue [facility] protocol for monitoring alcohol withdrawal  Thiamine and folic acid  Aspiration fall, seizure precaution

## 2025-06-29 NOTE — ED TRIAGE NOTES
"Chief Complaint   Patient presents with    Possible Stroke     Pt c/o feeling \"confused\".  Pt states he got locked out of his phone because he could not hit the correct numbers on his phone around approximately 7am this morning. Blood sugar 84. Pt A&O x4 in triage, equal  strengths but appears ataxic with the left side when asked to touch pt's nose then this RN's finger. Charge RN notified for stroke assessment.      BP (!) 188/116   Pulse 90   Temp 36.6 °C (97.8 °F) (Temporal)   Resp 18   Ht 1.829 m (6')   Wt 90 kg (198 lb 6.6 oz)   SpO2 94%   BMI 26.91 kg/m²     Pt ambulatory to charge desk for stroke assessment.  Report given to NOLAN Miramontes.   "

## 2025-06-29 NOTE — PROGRESS NOTES
Received call from lab about critical Mg 0.9. Verbal readback for confirmation. Dr Ashraf notified at [phone].

## 2025-06-29 NOTE — PROGRESS NOTES
"Brief Stroke Neurology Note    60 F male presenting with blurred vision confusion. Patient reports being unable to read the numbers to unlock his phone this morning, and states this visual acuity persists now. On exam he is nonfocal, with peripheral and central vision intact. The patient endorses drinking alcohol last night and when asked about illicit drub usage he replied \"we were partying and some 'stuff' went up the nose.\" He is hypertensive with a blood pressure of 188/116 in the ED. Noncontrast CT head was negative for acute intracranial abnormalities. CTA head and neck were negative for LVO, MeVO, dissection, and critical flow limiting stenoses. CT Perfusion is unremarkable. With some variability in exam, and negative image findings, as well as a last known well of 0730 the patient is not a candidate for off label thrombolytic therapy. Low suspicion for an acute ischemic process at this time. Recommend toxic/metabolic work up per ED team and blood pressure management. Can defer MRI brain unless patient does not improve. Please consult neurology with acute findings.     Case reviewed and plan created with Dr. Will Schneider, Vascular Neurology, and Dr. Ulises Viera. Emergency Medicine Attending. Please call with any questions.    CHRIS Jones  Vascular Neurology, Inpatient Neurology  [phone]         "

## 2025-06-29 NOTE — ED NOTES
"Pt being evaluated by neurology and states he may have dabbled in alittle \"something up the nose last night\"   "

## 2025-06-29 NOTE — ASSESSMENT & PLAN NOTE
He endorses confusion and numerous neurologic deficits after taking a hit of methamphetamine  CT of the head as well as CTA of the head and neck are negative for acute processes  Low suspicion for stroke though remains in the differential therefore MRI of the brain has been ordered and will give an aspirin daily and initiate the stroke protocol  6/30/2025  His mentation has improved.  Pending MRI neurochecks every 4 hours

## 2025-06-29 NOTE — H&P
"Hospital Medicine History & Physical Note    Date of Service  6/29/2025    Primary Care Physician  Pcp Pt States None    Consultants  none  Neurology was curbsided from the ER    Code Status  Full Code    Chief Complaint  Chief Complaint   Patient presents with    Possible Stroke     Pt c/o feeling \"confused\".  Pt states he got locked out of his phone because he could not hit the correct numbers on his phone around approximately 7am this morning. Blood sugar 84. Pt A&O x4 in triage, equal  strengths but appears ataxic with the left side when asked to touch pt's nose then this RN's finger. Charge RN notified for stroke assessment.        History of Presenting Illness  Josh Jacob is a 60 y.o. male who presented 6/29/2025 with confusion and dysmetria.  Mr. Jacob has a past medical history of hypertension on lisinopril as well as hypothyroidism followed at the [facility] clinic.  He \"took a hit of meth\" this morning and then had troubles with his vision troubles functioning with both hands could not open the lock screen on his phone had troubles walking and balance.  He states he had great difficulty with his thinking.  He presented the emergency room where his blood pressure was quite elevated at 188/116 now 238/138.  CT of the head was unremarkable CTA of the head and neck were negative for acute processes and no large vessel occlusion.  He is found to have a creatinine of 2 with known baseline of 1.  Will be admitted to the neurology floor for continuous telemetry monitoring and IV fluids and further possible stroke workup.    I discussed the plan of care with Dr. Viera in person.    Review of Systems  Review of Systems   Eyes:  Positive for blurred vision.   Cardiovascular:  Negative for chest pain.   Neurological:  Positive for tremors and sensory change.   Psychiatric/Behavioral:  Positive for substance abuse.        Past Medical History   has a past medical history of Hypertension.  Hypothyroidism  Surgical " History   has a past surgical history that includes laparoscopic inguinal hernia repair (Right, 10/18/2017).     Family History  History reviewed. No pertinent family history.     Family history reviewed with patient. There is no family history that is pertinent to the chief complaint.     Social History   reports that he has been smoking cigarettes. He has a 25 pack-year smoking history. He has never used smokeless tobacco. He reports current alcohol use. He reports current drug use.  Drinks pint of vodka daily, snorts methamphetamine    Allergies  Allergies[1]    Medications  Prior to Admission Medications   Prescriptions Last Dose Informant Patient Reported? Taking?   LEVOTHYROXINE SODIUM PO  Patient Yes No   Sig: Take 1 Tab by mouth every day.   hydrOXYzine HCl (ATARAX) 50 MG Tab  Patient Yes No   Sig: Take 50 mg by mouth 1 time daily as needed for Itching.   lisinopril (PRINIVIL) 5 MG Tab  Patient Yes No   Sig: Take 5 mg by mouth every day.      Facility-Administered Medications: None       Physical Exam  Temp:  [36.6 °C (97.8 °F)] 36.6 °C (97.8 °F)  Pulse:  [90] 90  Resp:  [18] 18  BP: (188)/(116) 188/116  SpO2:  [94 %] 94 %  Blood Pressure: (!) 188/116   Temperature: 36.6 °C (97.8 °F)   Pulse: 90   Respiration: 18   Pulse Oximetry: 94 %       Physical Exam  Vitals and nursing note reviewed.   HENT:      Mouth/Throat:      Mouth: Mucous membranes are dry.      Comments: Edentulous  Cardiovascular:      Rate and Rhythm: Normal rate and regular rhythm.   Musculoskeletal:      Right lower leg: No edema.      Left lower leg: No edema.   Neurological:      Comments: No neurologic deficits.  Speech is somewhat pressured and is a modest not excellent historian         Laboratory:  Recent Labs     06/29/25  1308   WBC 6.4   RBC 3.47*   HEMOGLOBIN 11.0*   HEMATOCRIT 32.5*   MCV 93.7   MCH 31.7   MCHC 33.8   RDW 45.5   PLATELETCT 122*   MPV 10.3     Recent Labs     06/29/25  1308   SODIUM 141   POTASSIUM 4.0   CHLORIDE  "100   CO2 23   GLUCOSE 80   BUN 23*   CREATININE 1.98*   CALCIUM 9.7     Recent Labs     06/29/25  1308   ALTSGPT 16   ASTSGOT 31   ALKPHOSPHAT 88   TBILIRUBIN 0.5   GLUCOSE 80     Recent Labs     06/29/25  1308   APTT 26.6   INR 1.00     No results for input(s): \"NTPROBNP\" in the last 72 hours.      Recent Labs     06/29/25  1308   TROPONINT 36*       Imaging:  DX-CHEST-PORTABLE (1 VIEW)   Final Result      No acute cardiopulmonary abnormality.         CT-CTA NECK WITH & W/O-POST PROCESSING   Final Result      1.  No high-grade stenosis, large vessel occlusion, aneurysm or dissection.   2.  A 5 mm nodule in the left upper lobe. Follow-up guidelines for high and low risk patients are outlined below.      Pulmonary nodule guidelines:      Low Risk: No routine follow-up      High Risk: Optional CT at 12 months      Comments: Nodules less than 6 mm do not require routine follow-up, but certain patients at high risk with suspicious nodule morphology, upper lobe location, or both may warrant 12-month follow-up.      Low Risk - Minimal or absent history of smoking and of other known risk factors.      High Risk - History of smoking or of other known risk factors.      Note: These recommendations do not apply to lung cancer screening, patients with immunosuppression, or patients with known primary cancer.      Fleischner Society 2017 Guidelines for Management of Incidentally Detected Pulmonary Nodules in Adults         CT-CTA HEAD WITH & W/O-POST PROCESS   Final Result      1.  No large vessel occlusion, high-grade stenosis or aneurysm of the [place] of [place].   2.  No CT evidence of acute infarct, hemorrhage or mass.      CT-CEREBRAL PERFUSION ANALYSIS   Final Result      1. Cerebral blood flow less than 30% possibly representing completed infarct = 0 mL. Based on distribution of this finding, this is unlikely to represent artifact.      2. T Max more than 6 seconds possibly representing combination of completed infarct " "and ischemia = 0 mL. Based on the distribution of this finding, this is unlikely to represent artifact.      3. Mismatched volume possibly representing ischemic brain/penumbra= 0 mL      4.  Please note that this cerebral perfusion study and report is Quantitative and targets supratentorial (cerebral) perfusion for evaluation of large vessel territory acute ischemia/infarction. For example, lacunar infarcts, and brainstem/posterior fossa    ischemia/infarction are not evaluated on this study.  Data acquisition is subject to artifacts which can yield non-anatomically plausible perfusion maps which may be due to motion, bolus timing, signal to noise ratio, or other technical factors.    Perfusion map abnormalities which show non-anatomic distributions are likely artifact.   This study is not \"stand-alone\" and should only be utilized for diagnosis, management/treatment in correlation with CT, CTA, and/or MRI and clinical factors.         CT-HEAD W/O   Final Result      1.  No acute intracranial abnormality.   2.  Mild diffuse atrophy.   3.  RIGHT mastoid fluid, likely inflammatory.                  MR-BRAIN-W/O    (Results Pending)       EKG does not reveal any dynamic changes    Assessment/Plan:  Justification for Admission Status  I anticipate this patient will require at least two midnights for appropriate medical management, necessitating inpatient admission because IV fluids and further workup of his acute kidney injury as well as possible stroke workup and likely to require multiple doses of IV hypertensive medicines given his blood pressure of238/130 in setting of methamphetamine abuse    Patient will need a Telemetry bed on MEDICAL service .  The need is secondary to as above.    * Acute kidney injury (HCC)- (present on admission)  Assessment & Plan  Upon review of the records his baseline creatinine is 1 and currently is 2  Clinically he appears to be dehydrated perhaps exacerbated by methamphetamine abuse  IV " fluids will be given  Follow his urine output  Basic metabolic panel ordered for the morning    Confusion- (present on admission)  Assessment & Plan  He endorses confusion and numerous neurologic deficits after taking a hit of methamphetamine  CT of the head as well as CTA of the head and neck are negative for acute processes  Low suspicion for stroke though remains in the differential therefore MRI of the brain has been ordered and will give an aspirin daily and initiate the stroke protocol  He currently does not have any neurologic deficits    Alcohol abuse- (present on admission)  Assessment & Plan  He endorses drinking a pint of vodka daily  He is at significant risk of detox  Thiamine and multivitamins ordered  Check mag and phos and replace accordingly   CIWA protocol.    Tobacco abuse- (present on admission)  Assessment & Plan  Active smoker  Cessation encouragement  Nicotine patch    Methamphetamine abuse (HCC)- (present on admission)  Assessment & Plan  He endorses ongoing use by snorting it  Cessation encouraged    HTN (hypertension)- (present on admission)  Assessment & Plan  History of  He is on lisinopril as an outpatient which will be held due to acute kidney injury  Blood pressure is markedly elevated now likely exacerbated by methamphetamine and possibly component of alcohol withdrawal  Start Norvasc now  IV hydralazine for systolic blood pressure over 180        VTE prophylaxis: heparin ppx         [1]   Allergies  Allergen Reactions    Succinylcholine      Malignant hyperthermia

## 2025-06-29 NOTE — PROGRESS NOTES
4 Eyes Skin Assessment Completed by NOLAN Dallas and LIZET Luis.    Skin assessment is primarily focused on high risk bony prominences. Pay special attention to skin beneath and around medical devices, high risk bony prominences, skin to skin areas and areas where the patient lacks sensation to feel pain and areas where the patient previously had breakdown.     Head (Occipital):  WDL   Ears (Under Medical Devices): WDL   Nose (Under Medical Devices): WDL   Mouth:  WDL   Neck: WDL   Breast/Chest:  WDL   Shoulder Blades:  WDL   Spine:   WDL   (R) Arm/Elbow/Hand: WDL   (L) Arm/Elbow/Hand: WDL   Abdomen: WDL   Pannus/Groin:  WDL   Sacrum/Coccyx:   WDL   (R) Ischial Tuberosity (Sit Bones):  WDL   (L) Ischial Tuberosity (Sit Bones):  WDL   (R) Leg:  Red and Blanching   (L) Leg:  WDL   (R) Heel:  Cracked and Dry   (R) Foot/Toe: Abrasion   (L) Heel: Cracked and Dry   (L) Foot/Toe:  Abrasion             DEVICES IN USE:   Respiratory Devices:  NA, patient on room air  Feeding Devices:  N/A   Lines & BP Monitoring Devices:  Peripheral IV and BP cuff    Orthopedic Devices:  N/A  Miscellaneous Devices:  Telemetry monitor    PROTOCOL INTERVENTIONS:   Standard/Trauma Bed:  Already in place    WOUND PHOTOS:   Completed and in EPIC     WOUND CONSULT:   N/A, no advanced wound care needs identified

## 2025-06-30 ENCOUNTER — APPOINTMENT (OUTPATIENT)
Dept: CARDIOLOGY | Facility: MEDICAL CENTER | Age: 60
End: 2025-06-30
Attending: HOSPITALIST
Payer: MEDICAID

## 2025-06-30 VITALS
WEIGHT: 199.3 LBS | TEMPERATURE: 97 F | HEART RATE: 69 BPM | SYSTOLIC BLOOD PRESSURE: 154 MMHG | BODY MASS INDEX: 26.99 KG/M2 | RESPIRATION RATE: 16 BRPM | DIASTOLIC BLOOD PRESSURE: 93 MMHG | HEIGHT: 72 IN | OXYGEN SATURATION: 97 %

## 2025-06-30 PROBLEM — D69.6 THROMBOCYTOPENIA (HCC): Status: ACTIVE | Noted: 2025-06-30

## 2025-06-30 LAB
ANION GAP SERPL CALC-SCNC: 11 MMOL/L (ref 7–16)
ANION GAP SERPL CALC-SCNC: 11 MMOL/L (ref 7–16)
BUN SERPL-MCNC: 18 MG/DL (ref 8–22)
BUN SERPL-MCNC: 22 MG/DL (ref 8–22)
CALCIUM SERPL-MCNC: 8.6 MG/DL (ref 8.5–10.5)
CALCIUM SERPL-MCNC: 9.1 MG/DL (ref 8.5–10.5)
CHLORIDE SERPL-SCNC: 101 MMOL/L (ref 96–112)
CHLORIDE SERPL-SCNC: 99 MMOL/L (ref 96–112)
CHOLEST SERPL-MCNC: 145 MG/DL (ref 100–199)
CO2 SERPL-SCNC: 25 MMOL/L (ref 20–33)
CO2 SERPL-SCNC: 25 MMOL/L (ref 20–33)
CREAT SERPL-MCNC: 1.52 MG/DL (ref 0.5–1.4)
CREAT SERPL-MCNC: 1.71 MG/DL (ref 0.5–1.4)
GFR SERPLBLD CREATININE-BSD FMLA CKD-EPI: 45 ML/MIN/1.73 M 2
GFR SERPLBLD CREATININE-BSD FMLA CKD-EPI: 52 ML/MIN/1.73 M 2
GLUCOSE SERPL-MCNC: 119 MG/DL (ref 65–99)
GLUCOSE SERPL-MCNC: 97 MG/DL (ref 65–99)
HDLC SERPL-MCNC: 78 MG/DL
LDLC SERPL CALC-MCNC: 37 MG/DL
LV EJECT FRACT  99904: 56
LV EJECT FRACT MOD 2C 99903: 50
LV EJECT FRACT MOD 4C 99902: 61.75
LV EJECT FRACT MOD BP 99901: 56.41
MAGNESIUM SERPL-MCNC: 1.9 MG/DL (ref 1.5–2.5)
PHOSPHATE SERPL-MCNC: 3.8 MG/DL (ref 2.5–4.5)
POTASSIUM SERPL-SCNC: 3.6 MMOL/L (ref 3.6–5.5)
POTASSIUM SERPL-SCNC: 4 MMOL/L (ref 3.6–5.5)
SODIUM SERPL-SCNC: 135 MMOL/L (ref 135–145)
SODIUM SERPL-SCNC: 137 MMOL/L (ref 135–145)
TRIGL SERPL-MCNC: 152 MG/DL (ref 0–149)

## 2025-06-30 PROCEDURE — 770020 HCHG ROOM/CARE - TELE (206)

## 2025-06-30 PROCEDURE — 700102 HCHG RX REV CODE 250 W/ 637 OVERRIDE(OP): Performed by: HOSPITALIST

## 2025-06-30 PROCEDURE — 80048 BASIC METABOLIC PNL TOTAL CA: CPT

## 2025-06-30 PROCEDURE — A9270 NON-COVERED ITEM OR SERVICE: HCPCS | Performed by: HOSPITALIST

## 2025-06-30 PROCEDURE — 83735 ASSAY OF MAGNESIUM: CPT

## 2025-06-30 PROCEDURE — 93306 TTE W/DOPPLER COMPLETE: CPT

## 2025-06-30 PROCEDURE — 92610 EVALUATE SWALLOWING FUNCTION: CPT

## 2025-06-30 PROCEDURE — 80061 LIPID PANEL: CPT

## 2025-06-30 PROCEDURE — 51798 US URINE CAPACITY MEASURE: CPT

## 2025-06-30 PROCEDURE — 99233 SBSQ HOSP IP/OBS HIGH 50: CPT | Performed by: HOSPITALIST

## 2025-06-30 PROCEDURE — 700105 HCHG RX REV CODE 258: Performed by: HOSPITALIST

## 2025-06-30 PROCEDURE — 84100 ASSAY OF PHOSPHORUS: CPT

## 2025-06-30 PROCEDURE — 700111 HCHG RX REV CODE 636 W/ 250 OVERRIDE (IP): Performed by: HOSPITALIST

## 2025-06-30 PROCEDURE — 97162 PT EVAL MOD COMPLEX 30 MIN: CPT

## 2025-06-30 PROCEDURE — 93306 TTE W/DOPPLER COMPLETE: CPT | Mod: 26 | Performed by: INTERNAL MEDICINE

## 2025-06-30 RX ORDER — AMLODIPINE BESYLATE 5 MG/1
2.5 TABLET ORAL ONCE
Status: COMPLETED | OUTPATIENT
Start: 2025-06-30 | End: 2025-06-30

## 2025-06-30 RX ORDER — NICOTINE 21 MG/24HR
21 PATCH, TRANSDERMAL 24 HOURS TRANSDERMAL
Status: DISCONTINUED | OUTPATIENT
Start: 2025-06-30 | End: 2025-07-01 | Stop reason: HOSPADM

## 2025-06-30 RX ORDER — AMLODIPINE BESYLATE 5 MG/1
7.5 TABLET ORAL
Status: DISCONTINUED | OUTPATIENT
Start: 2025-07-01 | End: 2025-06-30

## 2025-06-30 RX ORDER — AMLODIPINE BESYLATE 10 MG/1
10 TABLET ORAL
Status: DISCONTINUED | OUTPATIENT
Start: 2025-07-01 | End: 2025-07-01 | Stop reason: HOSPADM

## 2025-06-30 RX ADMIN — FOLIC ACID 1 MG: 1 TABLET ORAL at 06:13

## 2025-06-30 RX ADMIN — THERA TABS 1 TABLET: TAB at 06:13

## 2025-06-30 RX ADMIN — ASPIRIN 81 MG: 81 TABLET, COATED ORAL at 06:14

## 2025-06-30 RX ADMIN — SODIUM CHLORIDE, POTASSIUM CHLORIDE, SODIUM LACTATE AND CALCIUM CHLORIDE: 600; 310; 30; 20 INJECTION, SOLUTION INTRAVENOUS at 14:56

## 2025-06-30 RX ADMIN — AMLODIPINE BESYLATE 5 MG: 5 TABLET ORAL at 06:13

## 2025-06-30 RX ADMIN — AMLODIPINE BESYLATE 2.5 MG: 5 TABLET ORAL at 07:44

## 2025-06-30 RX ADMIN — HEPARIN SODIUM 5000 UNITS: 5000 INJECTION, SOLUTION INTRAVENOUS; SUBCUTANEOUS at 21:48

## 2025-06-30 RX ADMIN — SODIUM CHLORIDE, POTASSIUM CHLORIDE, SODIUM LACTATE AND CALCIUM CHLORIDE: 600; 310; 30; 20 INJECTION, SOLUTION INTRAVENOUS at 23:01

## 2025-06-30 RX ADMIN — Medication 100 MG: at 06:13

## 2025-06-30 RX ADMIN — NICOTINE TRANSDERMAL SYSTEM 21 MG: 21 PATCH, EXTENDED RELEASE TRANSDERMAL at 12:47

## 2025-06-30 RX ADMIN — SODIUM CHLORIDE, POTASSIUM CHLORIDE, SODIUM LACTATE AND CALCIUM CHLORIDE: 600; 310; 30; 20 INJECTION, SOLUTION INTRAVENOUS at 06:20

## 2025-06-30 ASSESSMENT — LIFESTYLE VARIABLES
VISUAL DISTURBANCES: NOT PRESENT
AUDITORY DISTURBANCES: NOT PRESENT
VISUAL DISTURBANCES: NOT PRESENT
NAUSEA AND VOMITING: NO NAUSEA AND NO VOMITING
TREMOR: NO TREMOR
PAROXYSMAL SWEATS: NO SWEAT VISIBLE
ANXIETY: NO ANXIETY (AT EASE)
HEADACHE, FULLNESS IN HEAD: NOT PRESENT
AGITATION: NORMAL ACTIVITY
AUDITORY DISTURBANCES: NOT PRESENT
ORIENTATION AND CLOUDING OF SENSORIUM: ORIENTED AND CAN DO SERIAL ADDITIONS
VISUAL DISTURBANCES: NOT PRESENT
TOTAL SCORE: 0
ORIENTATION AND CLOUDING OF SENSORIUM: ORIENTED AND CAN DO SERIAL ADDITIONS
ANXIETY: NO ANXIETY (AT EASE)
HEADACHE, FULLNESS IN HEAD: NOT PRESENT
ORIENTATION AND CLOUDING OF SENSORIUM: ORIENTED AND CAN DO SERIAL ADDITIONS
TREMOR: NO TREMOR
NAUSEA AND VOMITING: NO NAUSEA AND NO VOMITING
AGITATION: NORMAL ACTIVITY
TOTAL SCORE: 0
AUDITORY DISTURBANCES: NOT PRESENT
NAUSEA AND VOMITING: NO NAUSEA AND NO VOMITING
PAROXYSMAL SWEATS: NO SWEAT VISIBLE
ORIENTATION AND CLOUDING OF SENSORIUM: ORIENTED AND CAN DO SERIAL ADDITIONS
TREMOR: NO TREMOR
VISUAL DISTURBANCES: NOT PRESENT
PAROXYSMAL SWEATS: NO SWEAT VISIBLE
TOTAL SCORE: 0
AGITATION: NORMAL ACTIVITY
ANXIETY: NO ANXIETY (AT EASE)
TOTAL SCORE: 0
TREMOR: NO TREMOR
NAUSEA AND VOMITING: NO NAUSEA AND NO VOMITING
PAROXYSMAL SWEATS: NO SWEAT VISIBLE
TREMOR: NO TREMOR
VISUAL DISTURBANCES: NOT PRESENT
PAROXYSMAL SWEATS: NO SWEAT VISIBLE
HEADACHE, FULLNESS IN HEAD: NOT PRESENT
AGITATION: NORMAL ACTIVITY
HEADACHE, FULLNESS IN HEAD: NOT PRESENT
AUDITORY DISTURBANCES: NOT PRESENT
NAUSEA AND VOMITING: NO NAUSEA AND NO VOMITING
ANXIETY: NO ANXIETY (AT EASE)
AUDITORY DISTURBANCES: NOT PRESENT
HEADACHE, FULLNESS IN HEAD: NOT PRESENT
ORIENTATION AND CLOUDING OF SENSORIUM: ORIENTED AND CAN DO SERIAL ADDITIONS
ANXIETY: NO ANXIETY (AT EASE)
AGITATION: NORMAL ACTIVITY
TOTAL SCORE: 0

## 2025-06-30 ASSESSMENT — ENCOUNTER SYMPTOMS
NERVOUS/ANXIOUS: 1
HEARTBURN: 0
PALPITATIONS: 0
FEVER: 0
MYALGIAS: 1
BLURRED VISION: 0
HEADACHES: 1
DOUBLE VISION: 0
NAUSEA: 0

## 2025-06-30 ASSESSMENT — COGNITIVE AND FUNCTIONAL STATUS - GENERAL
SUGGESTED CMS G CODE MODIFIER MOBILITY: CH
MOBILITY SCORE: 24

## 2025-06-30 ASSESSMENT — GAIT ASSESSMENTS
GAIT LEVEL OF ASSIST: SUPERVISED
DISTANCE (FEET): 300

## 2025-06-30 ASSESSMENT — PAIN DESCRIPTION - PAIN TYPE: TYPE: ACUTE PAIN

## 2025-06-30 NOTE — THERAPY
"Speech Language Pathology   Clinical Swallow Evaluation     Patient Name:  Josh Jacob  AGE:  60 y.o., SEX:  male  Medical Record #:  [id number]  Date of Service:  6/30/2025         History of Present Illness  59 y/o admitted on 6/29 for possible stroke, confusion and dysmetria. Meth+. Not seen by SLP before at [facility].    Dx chest 6/29:  No acute cardiopulmonary abnormality.    CT of head 6/29:  1.  No acute intracranial abnormality.  2.  Mild diffuse atrophy.  3.  RIGHT mastoid fluid, likely inflammatory.      PMH: hypertension on lisinopril as well as hypothyroidism      General Information:  Vitals  O2 (LPM): 0  O2 Delivery Device: None - Room Air  Level of Consciousness: Alert  Orientation: Oriented x 4  Follows Directives: Yes      Prior Living Situation & Level of Function:  Housing / Facility: 2 Story Apartment / Condo  Lives with - Patient's Self Care Capacity: Alone and Able to Care For Self  Communication: WFL  Swallowing: oral dysphagia 2/2 edentulism but consumes a regular diet at baseline       Oral Mechanism Evaluation:  Dentition: Edentulous, Denture(s) not available at time of evaluation   Facial Symmetry: Equal  Labial Observations: WFL   Motor Speech: increased talking rate which intermittently impacted speech intelligibility            Laryngeal Function:  Secretion Management: Adequate  Voice Quality: WFL        Subjective  Pt endorsed being able to consume \"95%\" of foods despite edentulism. He reported having dentures but not wearing them during meals. Pt with increased rate of speech which intermittently affected intelligibility. Pt was restlessness, but baseline per his report.      Assessment  Current Method of Nutrition: Oral diet (regular/thin liquids)  Positioning: Neal's (60-90 degrees)  Bolus Administration: Patient  O2 (LPM): 0 O2 Delivery Device: None - Room Air  Factor(s) Affecting Performance: None              Swallowing Trials:  Swallowing Trials  Thin Liquid (TN0): WFL  Pureed " (PU4): WFL  Regular (RG7): WFL      Comments: No overt s/sx of aspiration noted with trials of thin liquids via cup sip, pudding, or solids. Pt fed self independently without obvious difficulty. Vocal quality clear following the swallow. Mastication and suspected AP propulsion timely. Pt denied globus sensation or odynophagia. No obvious oral residue noted after the swallow.        Clinical Impressions  Pt is presenting with a functional oropharyngeal swallow. Recommend continuation of a regular/thin liquid diet. No further acute SLP services are recommended at this time to address dysphagia. Please re-consult SLP with any s/sx of aspiration or changes in swallow function.      Recommendations  Diet Consistency: regular/thin liquids  Instrumentation: None indicated at this time  Medication: As tolerated  Supervision: Independent  Positioning: Fully upright and midline during oral intake  Risk Management : None  Oral Care: BID         SLP Treatment Plan  Treatment Plan: None Indicated  SLP Frequency: N/A - Evaluation Only  Estimated Duration: N/A - Evaluation Only      Anticipated Discharge Needs  Discharge Recommendations: Anticipate that the patient will have no further speech therapy needs after discharge from the hospital   Therapy Recommendations Upon DC: Not Indicated                  Maggie Cardoza, SLP

## 2025-06-30 NOTE — CARE PLAN
The patient is Stable - Low risk of patient condition declining or worsening    Shift Goals  Clinical Goals: RAYRAY, SHANEKA  Patient Goals: rest  Family Goals: updates      Problem: Self Care  Goal: Patient will have the ability to perform ADLs independently or with assistance (bathe, groom, dress, toilet and feed)  Outcome: Progressing     Problem: Fall Risk  Goal: Patient will remain free from falls  Outcome: Progressing   Safety precautions and fall prevention in place. Fall prevention education provided. Patient verbalized understanding. Bed in low locked position, bed alarm on, treaded socks on patient, call bell within reach. Patient calls appropriately as needed.

## 2025-06-30 NOTE — CARE PLAN
The patient is Stable - Low risk of patient condition declining or worsening    Shift Goals  Clinical Goals: NIHSS, Q4 neuro checks, CIWA, IV Fluids, MRI  Patient Goals: rest  Family Goals: updates    Progress made toward(s) clinical / shift goals:        Problem: Optimal Care for Alcohol Withdrawal  Goal: Optimal Care for the alcohol withdrawal patient  Outcome: Progressing     Problem: Seizure Precautions  Goal: Implementation of seizure precautions  Outcome: Progressing     Problem: Psychosocial  Goal: Patient's level of anxiety will decrease  Outcome: Progressing     Problem: Risk for Aspiration  Goal: Patient's risk for aspiration will be absent or decrease  Outcome: Progressing     Problem: Urinary Elimination  Goal: Establish and maintain regular urinary output  Outcome: Progressing

## 2025-06-30 NOTE — HOSPITAL COURSE
This is  60-year-old male with a past medical history significant for hypertension on lisinopril, hypothyroidism presented to the ER for possible code stroke.  He stated that he took a hit of meth this morning and had trouble with his vision and trouble with balance.  Open presents in ER, he is found to be hypertensive with a blood pressure of 238/138, CT head, CT of the head and neck negative for any large vessel occlusion.    Also patient was found to be in CHRISTINE with creatinine of 2, his baseline creatinine is 1.  During the stay in the hospital, patient was aggressively assisted with IV fluid, MRI of the brain ordered, pending.    Interval events:  -- Patient is alert, awake, answer question appropriate, vital sign has been reviewed, patient is still hypertensive, was started amlodipine 5, will make amlodipine 10  --Renal function continued to improve, creatinine at 1.7, will have repeat BMP tomorrow.-Bladder scan, urine sodium, urine creatinine. Continue iVF  --UDS positive for fentanyl and methamphetamine  MRI brain ordered, pending  --At this time patient continue to monitor in telemetry.

## 2025-06-30 NOTE — PROGRESS NOTES
"Hospital Medicine Daily Progress Note    Date of Service  6/30/2025    Chief Complaint  Josh Jacob is a 60 y.o. male admitted 6/29/2025 with   Chief Complaint   Patient presents with    Possible Stroke     Pt c/o feeling \"confused\".  Pt states he got locked out of his phone because he could not hit the correct numbers on his phone around approximately 7am this morning. Blood sugar 84. Pt A&O x4 in triage, equal  strengths but appears ataxic with the left side when asked to touch pt's nose then this RN's finger. Charge RN notified for stroke assessment.          Hospital Course    This is  60-year-old male with a past medical history significant for hypertension on lisinopril, hypothyroidism presented to the ER for possible code stroke.  He stated that he took a hit of meth this morning and had trouble with his vision and trouble with balance.  Open presents in ER, he is found to be hypertensive with a blood pressure of 238/138, CT head, CT of the head and neck negative for any large vessel occlusion.    Also patient was found to be in CHRISTINE with creatinine of 2, his baseline creatinine is 1.  During the stay in the hospital, patient was aggressively assisted with IV fluid, MRI of the brain ordered, pending.    Interval events:  -- Patient is alert, awake, answering question appropriate, vital sign has been reviewed, patient is still hypertensive, was started amlodipine 5, will make amlodipine 10  --Renal function continued to improve, creatinine at 1.7, will have repeat BMP tomorrow.-Bladder scan, urine sodium, urine creatinine. Continue iVF  --UDS positive for fentanyl and methamphetamine  MRI brain ordered, pending  --At this time patient continue to monitor in telemetry.    I have discussed this patient's plan of care and discharge plan at IDT rounds today with Case Management, Nursing, Nursing leadership, and other members of the IDT team.    Consultants/Specialty  neurology    Code Status  Full " Code    Disposition  The patient is not medically cleared for discharge to home or a post-acute facility.      I have placed the appropriate orders for post-discharge needs.    Review of Systems  Review of Systems   Constitutional:  Negative for fever and malaise/fatigue.   HENT:  Negative for congestion.    Eyes:  Negative for blurred vision and double vision.   Cardiovascular:  Negative for chest pain and palpitations.   Gastrointestinal:  Negative for heartburn and nausea.   Genitourinary:  Negative for dysuria.   Musculoskeletal:  Positive for myalgias.   Neurological:  Positive for headaches.   Psychiatric/Behavioral:  The patient is nervous/anxious.         Physical Exam  Temp:  [36.1 °C (97 °F)-36.5 °C (97.7 °F)] 36.1 °C (97 °F)  Pulse:  [69-82] 74  Resp:  [16-20] 17  BP: (110-204)/() 161/97  SpO2:  [92 %-100 %] 93 %    Physical Exam  Vitals and nursing note reviewed.   Constitutional:       Appearance: Normal appearance.   HENT:      Head: Normocephalic and atraumatic.   Eyes:      Extraocular Movements: Extraocular movements intact.   Cardiovascular:      Rate and Rhythm: Normal rate.   Pulmonary:      Breath sounds: Normal breath sounds. No rales.   Abdominal:      General: There is no distension.      Palpations: Abdomen is soft.   Musculoskeletal:      Cervical back: Neck supple.      Right lower leg: No edema.      Left lower leg: No edema.   Skin:     General: Skin is warm.   Neurological:      Mental Status: He is alert and oriented to person, place, and time.      Cranial Nerves: No cranial nerve deficit.         Fluids    Intake/Output Summary (Last 24 hours) at 6/30/2025 1357  Last data filed at 6/30/2025 1251  Gross per 24 hour   Intake 2000 ml   Output 900 ml   Net 1100 ml        Laboratory  Recent Labs     06/29/25  1308   WBC 6.4   RBC 3.47*   HEMOGLOBIN 11.0*   HEMATOCRIT 32.5*   MCV 93.7   MCH 31.7   MCHC 33.8   RDW 45.5   PLATELETCT 122*   MPV 10.3     Recent Labs     06/29/25  1308  06/30/25  0334   SODIUM 141 137   POTASSIUM 4.0 3.6   CHLORIDE 100 101   CO2 23 25   GLUCOSE 80 119*   BUN 23* 22   CREATININE 1.98* 1.71*   CALCIUM 9.7 8.6     Recent Labs     06/29/25  1308   APTT 26.6   INR 1.00         Recent Labs     06/30/25  0334   TRIGLYCERIDE 152*   HDL 78   LDL 37       Imaging  DX-CHEST-PORTABLE (1 VIEW)   Final Result      No acute cardiopulmonary abnormality.         CT-CTA NECK WITH & W/O-POST PROCESSING   Final Result      1.  No high-grade stenosis, large vessel occlusion, aneurysm or dissection.   2.  A 5 mm nodule in the left upper lobe. Follow-up guidelines for high and low risk patients are outlined below.      Pulmonary nodule guidelines:      Low Risk: No routine follow-up      High Risk: Optional CT at 12 months      Comments: Nodules less than 6 mm do not require routine follow-up, but certain patients at high risk with suspicious nodule morphology, upper lobe location, or both may warrant 12-month follow-up.      Low Risk - Minimal or absent history of smoking and of other known risk factors.      High Risk - History of smoking or of other known risk factors.      Note: These recommendations do not apply to lung cancer screening, patients with immunosuppression, or patients with known primary cancer.      Fleischner Society 2017 Guidelines for Management of Incidentally Detected Pulmonary Nodules in Adults         CT-CTA HEAD WITH & W/O-POST PROCESS   Final Result      1.  No large vessel occlusion, high-grade stenosis or aneurysm of the [place].   2.  No CT evidence of acute infarct, hemorrhage or mass.      CT-CEREBRAL PERFUSION ANALYSIS   Final Result      1. Cerebral blood flow less than 30% possibly representing completed infarct = 0 mL. Based on distribution of this finding, this is unlikely to represent artifact.      2. T Max more than 6 seconds possibly representing combination of completed infarct and ischemia = 0 mL. Based on the distribution of this  "finding, this is unlikely to represent artifact.      3. Mismatched volume possibly representing ischemic brain/penumbra= 0 mL      4.  Please note that this cerebral perfusion study and report is Quantitative and targets supratentorial (cerebral) perfusion for evaluation of large vessel territory acute ischemia/infarction. For example, lacunar infarcts, and brainstem/posterior fossa    ischemia/infarction are not evaluated on this study.  Data acquisition is subject to artifacts which can yield non-anatomically plausible perfusion maps which may be due to motion, bolus timing, signal to noise ratio, or other technical factors.    Perfusion map abnormalities which show non-anatomic distributions are likely artifact.   This study is not \"stand-alone\" and should only be utilized for diagnosis, management/treatment in correlation with CT, CTA, and/or MRI and clinical factors.         CT-HEAD W/O   Final Result      1.  No acute intracranial abnormality.   2.  Mild diffuse atrophy.   3.  RIGHT mastoid fluid, likely inflammatory.                  MR-BRAIN-W/O    (Results Pending)   EC-ECHOCARDIOGRAM COMPLETE W/ CONT    (Results Pending)        Assessment/Plan  * Acute kidney injury (HCC)- (present on admission)  Assessment & Plan  Upon review of the records his baseline creatinine is 1 and currently is 2  Clinically he appears to be dehydrated perhaps exacerbated by methamphetamine abuse  Continue ivf  I/Os   Bladder scan , Urine Na and Urine creatinine    Thrombocytopenia (HCC)  Assessment & Plan  At 122   Monitor CBC    Confusion- (present on admission)  Assessment & Plan  He endorses confusion and numerous neurologic deficits after taking a hit of methamphetamine  CT of the head as well as CTA of the head and neck are negative for acute processes  Low suspicion for stroke though remains in the differential therefore MRI of the brain has been ordered and will give an aspirin daily and initiate the stroke " protocol  6/30/2025  His mentation has improved.  Pending MRI neurochecks every 4 hours    Alcohol abuse- (present on admission)  Assessment & Plan  He endorses drinking a pint of vodka daily  He is at significant risk of detox  Continue [facility] protocol for monitoring alcohol withdrawal  Thiamine and folic acid  Aspiration fall, seizure precaution    Tobacco abuse- (present on admission)  Assessment & Plan  Active smoker  Cessation encouragement  Nicotine patch    Methamphetamine abuse (HCC)- (present on admission)  Assessment & Plan  He endorses ongoing use by snorting it  Cessation encouraged    HTN (hypertension)- (present on admission)  Assessment & Plan  Patient is on lisinopril as an outpatient, will hold considering patient CHRISTINE.    Was started amlodipine 5, will  increase to 10         VTE prophylaxis: heparin

## 2025-06-30 NOTE — PROGRESS NOTES
Received report from NOC shift RN. Patient is resting and sleeping in bed comfortably, VSS, no signs of distress, even chest rise and fall. Bed in lowest and locked position, call light in reach, bed alarm on, plan of care ongoing.

## 2025-07-01 ENCOUNTER — HOSPITAL ENCOUNTER (INPATIENT)
Dept: RADIOLOGY | Facility: MEDICAL CENTER | Age: 60
DRG: 682 | End: 2025-07-01
Attending: HOSPITALIST | Admitting: HOSPITALIST
Payer: MEDICAID

## 2025-07-01 ENCOUNTER — APPOINTMENT (OUTPATIENT)
Dept: RADIOLOGY | Facility: MEDICAL CENTER | Age: 60
DRG: 682 | End: 2025-07-01
Attending: HOSPITALIST
Payer: MEDICAID

## 2025-07-01 ENCOUNTER — PHARMACY VISIT (OUTPATIENT)
Dept: PHARMACY | Facility: MEDICAL CENTER | Age: 60
End: 2025-07-01
Payer: COMMERCIAL

## 2025-07-01 VITALS
WEIGHT: 198.85 LBS | BODY MASS INDEX: 26.93 KG/M2 | TEMPERATURE: 98 F | DIASTOLIC BLOOD PRESSURE: 103 MMHG | RESPIRATION RATE: 17 BRPM | HEIGHT: 72 IN | HEART RATE: 55 BPM | SYSTOLIC BLOOD PRESSURE: 170 MMHG | OXYGEN SATURATION: 96 %

## 2025-07-01 PROBLEM — N17.9 ACUTE KIDNEY INJURY (HCC): Status: RESOLVED | Noted: 2025-06-29 | Resolved: 2025-07-01

## 2025-07-01 PROBLEM — R41.0 CONFUSION: Status: RESOLVED | Noted: 2025-06-29 | Resolved: 2025-07-01

## 2025-07-01 LAB
ALBUMIN SERPL BCP-MCNC: 3.9 G/DL (ref 3.2–4.9)
ANION GAP SERPL CALC-SCNC: 12 MMOL/L (ref 7–16)
BUN SERPL-MCNC: 13 MG/DL (ref 8–22)
CALCIUM ALBUM COR SERPL-MCNC: 9.3 MG/DL (ref 8.5–10.5)
CALCIUM SERPL-MCNC: 9.2 MG/DL (ref 8.5–10.5)
CHLORIDE SERPL-SCNC: 102 MMOL/L (ref 96–112)
CO2 SERPL-SCNC: 23 MMOL/L (ref 20–33)
CREAT SERPL-MCNC: 1.31 MG/DL (ref 0.5–1.4)
ERYTHROCYTE [DISTWIDTH] IN BLOOD BY AUTOMATED COUNT: 45.4 FL (ref 35.9–50)
GFR SERPLBLD CREATININE-BSD FMLA CKD-EPI: 62 ML/MIN/1.73 M 2
GLUCOSE SERPL-MCNC: 87 MG/DL (ref 65–99)
HCT VFR BLD AUTO: 30.3 % (ref 42–52)
HGB BLD-MCNC: 9.9 G/DL (ref 14–18)
MAGNESIUM SERPL-MCNC: 1.4 MG/DL (ref 1.5–2.5)
MCH RBC QN AUTO: 30.9 PG (ref 27–33)
MCHC RBC AUTO-ENTMCNC: 32.7 G/DL (ref 32.3–36.5)
MCV RBC AUTO: 94.7 FL (ref 81.4–97.8)
PHOSPHATE SERPL-MCNC: 3.2 MG/DL (ref 2.5–4.5)
PLATELET # BLD AUTO: 105 K/UL (ref 164–446)
PMV BLD AUTO: 11.6 FL (ref 9–12.9)
POTASSIUM SERPL-SCNC: 3.5 MMOL/L (ref 3.6–5.5)
RBC # BLD AUTO: 3.2 M/UL (ref 4.7–6.1)
SODIUM SERPL-SCNC: 137 MMOL/L (ref 135–145)
WBC # BLD AUTO: 6.2 K/UL (ref 4.8–10.8)

## 2025-07-01 PROCEDURE — 83735 ASSAY OF MAGNESIUM: CPT

## 2025-07-01 PROCEDURE — 700102 HCHG RX REV CODE 250 W/ 637 OVERRIDE(OP): Performed by: HOSPITALIST

## 2025-07-01 PROCEDURE — A9270 NON-COVERED ITEM OR SERVICE: HCPCS | Performed by: HOSPITALIST

## 2025-07-01 PROCEDURE — 97165 OT EVAL LOW COMPLEX 30 MIN: CPT

## 2025-07-01 PROCEDURE — 80069 RENAL FUNCTION PANEL: CPT

## 2025-07-01 PROCEDURE — 85027 COMPLETE CBC AUTOMATED: CPT

## 2025-07-01 PROCEDURE — 700111 HCHG RX REV CODE 636 W/ 250 OVERRIDE (IP): Performed by: HOSPITALIST

## 2025-07-01 PROCEDURE — RXMED WILLOW AMBULATORY MEDICATION CHARGE: Performed by: HOSPITALIST

## 2025-07-01 PROCEDURE — 97535 SELF CARE MNGMENT TRAINING: CPT

## 2025-07-01 PROCEDURE — 99406 BEHAV CHNG SMOKING 3-10 MIN: CPT

## 2025-07-01 PROCEDURE — 99239 HOSP IP/OBS DSCHRG MGMT >30: CPT | Performed by: HOSPITALIST

## 2025-07-01 PROCEDURE — 700105 HCHG RX REV CODE 258: Performed by: HOSPITALIST

## 2025-07-01 RX ORDER — AMLODIPINE BESYLATE 10 MG/1
10 TABLET ORAL DAILY
Qty: 100 TABLET | Refills: 0 | Status: SHIPPED | OUTPATIENT
Start: 2025-07-02 | End: 2026-08-06

## 2025-07-01 RX ORDER — POTASSIUM CHLORIDE 1500 MG/1
20 TABLET, EXTENDED RELEASE ORAL ONCE
Status: COMPLETED | OUTPATIENT
Start: 2025-07-01 | End: 2025-07-01

## 2025-07-01 RX ORDER — MAGNESIUM SULFATE HEPTAHYDRATE 40 MG/ML
4 INJECTION, SOLUTION INTRAVENOUS ONCE
Status: COMPLETED | OUTPATIENT
Start: 2025-07-01 | End: 2025-07-01

## 2025-07-01 RX ADMIN — THERA TABS 1 TABLET: TAB at 05:30

## 2025-07-01 RX ADMIN — FOLIC ACID 1 MG: 1 TABLET ORAL at 05:30

## 2025-07-01 RX ADMIN — NICOTINE TRANSDERMAL SYSTEM 21 MG: 21 PATCH, EXTENDED RELEASE TRANSDERMAL at 09:44

## 2025-07-01 RX ADMIN — SODIUM CHLORIDE, POTASSIUM CHLORIDE, SODIUM LACTATE AND CALCIUM CHLORIDE: 600; 310; 30; 20 INJECTION, SOLUTION INTRAVENOUS at 07:17

## 2025-07-01 RX ADMIN — HEPARIN SODIUM 5000 UNITS: 5000 INJECTION, SOLUTION INTRAVENOUS; SUBCUTANEOUS at 05:30

## 2025-07-01 RX ADMIN — Medication 100 MG: at 05:30

## 2025-07-01 RX ADMIN — POTASSIUM CHLORIDE 20 MEQ: 1500 TABLET, EXTENDED RELEASE ORAL at 10:44

## 2025-07-01 RX ADMIN — ASPIRIN 81 MG: 81 TABLET, COATED ORAL at 05:30

## 2025-07-01 RX ADMIN — MAGNESIUM SULFATE HEPTAHYDRATE 4 G: 4 INJECTION, SOLUTION INTRAVENOUS at 10:44

## 2025-07-01 RX ADMIN — AMLODIPINE BESYLATE 10 MG: 10 TABLET ORAL at 05:31

## 2025-07-01 ASSESSMENT — COGNITIVE AND FUNCTIONAL STATUS - GENERAL
SUGGESTED CMS G CODE MODIFIER DAILY ACTIVITY: CH
DAILY ACTIVITIY SCORE: 24
SUGGESTED CMS G CODE MODIFIER MOBILITY: CH
MOBILITY SCORE: 24
SUGGESTED CMS G CODE MODIFIER DAILY ACTIVITY: CH
DAILY ACTIVITIY SCORE: 24

## 2025-07-01 ASSESSMENT — ACTIVITIES OF DAILY LIVING (ADL): TOILETING: INDEPENDENT

## 2025-07-01 ASSESSMENT — GAIT ASSESSMENTS: DISTANCE (FEET): 15

## 2025-07-01 ASSESSMENT — FIBROSIS 4 INDEX: FIB4 SCORE: 3.81

## 2025-07-01 NOTE — PROGRESS NOTES
Josh Jacob has been provided discharge instructions, to include follow up care, home medications, and activity/diet reviewed. Copy of discharge instructions in patient chart, signed and reviewed. Patient verbalizes the understanding of the discharge instructions. PIV was removed prior to coming to [facility]. Arm band removed. Patient did not have home meds during admit. Meds to Beds verified and given to pt. Questions and concerns addressed prior to leaving the discharge lounge. Transported via cab, voucher in hand. Patient discharge to home.

## 2025-07-01 NOTE — DISCHARGE SUMMARY
"Discharge Summary    CHIEF COMPLAINT ON ADMISSION  Chief Complaint   Patient presents with    Possible Stroke     Pt c/o feeling \"confused\".  Pt states he got locked out of his phone because he could not hit the correct numbers on his phone around approximately 7am this morning. Blood sugar 84. Pt A&O x4 in triage, equal  strengths but appears ataxic with the left side when asked to touch pt's nose then this RN's finger. Charge RN notified for stroke assessment.        Reason for Admission  Confused     Admission Date  6/29/2025    CODE STATUS  Full Code    HPI & HOSPITAL COURSE    This is  60-year-old male with a past medical history significant for hypertension on lisinopril, hypothyroidism presented to the ER for possible code stroke.  He stated that he took a hit of meth this morning and had trouble with his vision and trouble with balance.  Open presents in ER, he is found to be hypertensive with a blood pressure of 238/138, CT head, CT of the head and neck negative for any large vessel occlusion.    Also patient was found to be in CHRISTINE with creatinine of 2, his baseline creatinine is 1.  Patient was started on IV fluids for hydration.  He was evaluated by neurology who felt that his symptoms were likely related to toxic encephalopathy and felt that MRI could be deferred unless patient's symptoms do not improve.    Patient was started on amlodipine with improvement in his blood pressure.  His mental status is back to normal.  Renal function is improved.  Patient was counseled on the importance of abstaining from illicit substances and discussed the importance of close outpatient follow-up for blood pressure recheck.    He is otherwise clinically stable for discharge        Therefore, he is discharged in good and stable condition to home with close outpatient follow-up.    The patient met 2-midnight criteria for an inpatient stay at the time of discharge.    Discharge Date  7/1/2025    FOLLOW UP ITEMS POST " DISCHARGE  Follow-up with PCP for blood pressure recheck  Repeat chemistry panel and CBC at follow-up    DISCHARGE DIAGNOSES  Principal Problem (Resolved):    Acute kidney injury (HCC) (POA: Yes)  Active Problems:    HTN (hypertension) (POA: Yes)    Methamphetamine abuse (HCC) (POA: Yes)    Tobacco abuse (POA: Yes)    Alcohol abuse (POA: Yes)    Thrombocytopenia (HCC) (POA: Unknown)  Resolved Problems:    Confusion (POA: Yes)      FOLLOW UP  No future appointments.  No follow-up provider specified.    MEDICATIONS ON DISCHARGE     Medication List        START taking these medications        Instructions   amLODIPine 10 MG Tabs  Start taking on: July 2, 2025  Commonly known as: Norvasc   Take 1 Tablet by mouth every day.  Dose: 10 mg            CONTINUE taking these medications        Instructions   allopurinol 100 MG Tabs  Commonly known as: Zyloprim   Take 100 mg by mouth every day.  Dose: 100 mg     atorvastatin 20 MG Tabs  Commonly known as: Lipitor   Take 20 mg by mouth every evening.  Dose: 20 mg     folic acid 1 MG Tabs  Commonly known as: Folvite   Take 1 mg by mouth every day.  Dose: 1 mg     gabapentin 300 MG Caps  Commonly known as: Neurontin   Take 300 mg by mouth 2 times a day as needed.  Dose: 300 mg     hydrOXYzine HCl 50 MG Tabs  Commonly known as: Atarax   Take 50 mg by mouth 1 time daily as needed for Itching.  Dose: 50 mg     levothyroxine 50 MCG Tabs  Commonly known as: Synthroid   Take 50 mcg by mouth every day.  Dose: 50 mcg     lisinopril 20 MG Tabs  Commonly known as: Prinivil   Take 20 mg by mouth every day.  Dose: 20 mg     naltrexone 50 MG Tabs  Commonly known as: Depade   Take 50 mg by mouth every day.  Dose: 50 mg     omeprazole 20 MG delayed-release capsule  Commonly known as: PriLOSEC   Take 20 mg by mouth every day.  Dose: 20 mg     potassium chloride SA 10 MEQ Tbcr  Commonly known as: K-Dur   Take 10 mEq by mouth every day.  Dose: 10 mEq     tamsulosin 0.4 MG capsule  Commonly known  as: Flomax   Take 0.4 mg by mouth every day.  Dose: 0.4 mg     thiamine 100 MG Tabs  Commonly known as: Vitamin B-1   Take 100 mg by mouth every day.  Dose: 100 mg     tizanidine 4 MG Tabs  Commonly known as: Zanaflex   Take 4 mg by mouth every 6 hours as needed. Indications: Muscle Spasticity  Dose: 4 mg     traZODone 100 MG Tabs  Commonly known as: Desyrel   Take 100 mg by mouth every evening.  Dose: 100 mg              Allergies  Allergies[1]    DIET  Orders Placed This Encounter   Procedures    Diet Order Diet: Regular     Standing Status:   Standing     Number of Occurrences:   1     Diet::   Regular [1]       ACTIVITY  As tolerated.  Weight bearing as tolerated        LABORATORY  Lab Results   Component Value Date    SODIUM 137 07/01/2025    POTASSIUM 3.5 (L) 07/01/2025    CHLORIDE 102 07/01/2025    CO2 23 07/01/2025    GLUCOSE 87 07/01/2025    BUN 13 07/01/2025    CREATININE 1.31 07/01/2025        Lab Results   Component Value Date    WBC 6.2 07/01/2025    HEMOGLOBIN 9.9 (L) 07/01/2025    HEMATOCRIT 30.3 (L) 07/01/2025    PLATELETCT 105 (L) 07/01/2025        Total time of the discharge process exceeds 35 minutes.       [1]   Allergies  Allergen Reactions    Succinylcholine      Malignant hyperthermia

## 2025-07-01 NOTE — PROGRESS NOTES
Monitor Summary  Rhythm: SR /SB  Rate: 53-70  Ectopy: (R) pvc  Measurements: .16/.05/.39  ---12 hr Chart Review---

## 2025-07-01 NOTE — RESPIRATORY CARE
"   SMOKING CESSATION EDUCATION by COPD CLINICAL EDUCATOR  7/1/2025 at 2:37 PM by Laly Muniz, RRT     Smoking Cessation Intervention and education completed, 3 minutes spent on smoking cessation education with patient.  Provided smoking cessation packet with \"Tips to Quit\" and brochure for \"Free Smoking Cessation Classes\".   "

## 2025-07-01 NOTE — DOCUMENTATION QUERY
"                                                                         [facility]                                                                       Query Response Note      PATIENT:               ERIKA HAQ  ACCT #:                  [id number]  MRN:                     [id number]  :                      1965  ADMIT DATE:       2025 1:07 PM  DISCH DATE:          RESPONDING  PROVIDER #:        [id number]           QUERY TEXT:    Hypertension is documented in the medical record.  Please specify the type of hypertension, if known:    NOTE:  If an appropriate response is not listed below, please respond with a new note.    The patient's Clinical Indicators include:  Clinical Findings:    - ED note: Dr. Viera:  \"Patient presents with Possible Stroke.  Pt c/o feeling \"confused\".\"  \"They believe patient's recent sympathomimetic drug use could be related to hypertensive emergency with endorgan damage manifesting as ataxia and altered mental status.\"    - Discharge summary- Dr. Soumya Chinchilla:  \"Patient was started on amlodipine with improvement in his blood pressure.  His mental status is back to normal.  Renal function is improved.\"  \"  HTN (hypertension) (POA: Yes)\"    per nursing flowsheet:   at 1229- BP- 188/116   at 1358- BP- 204/100- MAP- 135   at 1458- BP- 202/138- MAP- 159   at 1551- BP- 165/113- MAP- 130    Risk factors: Hypertension; CHRISTINE, toxic encephalopathy, methamphetamine abuse, alcohol abuse, thrombocytopenia    Treatment:  Labetalol 10 mg IV given  at 1430  amlodipine;      Please contact me with any questions:    Margie CHOWDHURY RN CCDS  [facility]  [email or web address]  Margie Stewart via Voal[employer]    Note: If you agree with a diagnosis listed above, please remember to include it in your concurrent daily documentation and onto the Discharge Summary.  Options provided:   -- Hypertensive emergency   -- Hypertensive urgency   -- Hypertension, unspecified   -- Secondary " hypertension   -- Other explanation, Please specify other explanation      Query created by: Margie Stewart on 7/1/2025 2:54 PM    RESPONSE TEXT:    Hypertensive urgency          Electronically signed by:  CHARLOTTE SOL MD 7/1/2025 3:29 PM

## 2025-07-01 NOTE — PROGRESS NOTES
Pt returned to [facility] and gave back his cab voucher, his friend is picking him up now. Cab request cancelled.

## 2025-07-01 NOTE — THERAPY
"Occupational Therapy   Initial Evaluation     Patient Name:  Josh Jacob  Age:  60 y.o., Sex:  male  Medical Record #:  [id number]  Today's Date:  7/1/2025       Assessment    Patient is 60 y.o. male who presented with confusion, L-sided ataxia, shaking, nausea. Pt has h/o HTN. Pt endorses meth use, ETOH abuse, tobacco abuse. Dx with CHRISTINE due to dehydration. Stroke w/u negative. Pt able to participate in bed mobility, transfers, standing grooming, FB dressing. No strength or coordination deficits identified. Pt demos very anxious affect and move very impulsively; educated on safety awareness for fall reduction. Pt is completing basic ADL and transfers with no more than supv in this setting. No further acute OT needs at this time.      Plan    Occupational Therapy Initial Treatment Plan   Duration: Evaluation only    DC Equipment Recommendations: None  Discharge Recommendations: Anticipate that the patient will have no further occupational therapy needs after discharge from the hospital     Subjective    \"I know I move too fast.\"     Objective       07/01/25 1046   Prior Living Situation   Prior Services None;Home-Independent   Housing / Facility 2 Story Apartment / Condo   Steps Into Home   (full flight)   Steps In Home 0   Rail Both Rail (Steps into Home)   Elevator No   Bathroom Set up Bathtub / Shower Combination;Grab Bars   Equipment Owned Grab Bar(s) In Tub / Shower   Lives with - Patient's Self Care Capacity Alone and Able to Care For Self   Comments Pt lives alone. 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)   Vitals   Patient BP Position Sitting   Blood Pressure (!) 170/103   O2 (LPM) 0   O2 Delivery Device None - Room Air   Vitals Comments BP /94; increased to above value following mobilization, standing activity   Pain   Pain Scales 0 to 10 Scale    Pain 0 - 10 Group   Therapist Pain Assessment Post Activity Pain Same as Prior to Activity;Nurse Notified  (no c/o pain this session)   Cognition    Cognition / Consciousness X   Level of Consciousness Alert   Safety Awareness Impulsive   Comments Pt moves very quickly with decreased awareness of surroundings   Active ROM Upper Body   Active ROM Upper Body  WDL   Strength Upper Body   Upper Body Strength  WDL   Sensation Upper Body   Upper Extremity Sensation  WDL   Upper Body Muscle Tone   Upper Body Muscle Tone  WDL   Coordination Upper Body   Coordination WDL   Balance Assessment   Sitting Balance (Static) Good   Sitting Balance (Dynamic) Fair +   Standing Balance (Static) Fair +   Standing Balance (Dynamic) Fair +   Weight Shift Sitting Good   Weight Shift Standing Good   Comments no AD, no LOB   Bed Mobility    Supine to Sit Modified Independent   Sit to Supine   (up to chair post)   Scooting Modified Independent  (seated)   Comments flat bed, no rail   ADL Assessment   Grooming Supervision;Standing  (oral care, facial hygiene at sink)   Upper Body Dressing Modified Independent  (don t-shirt)   Lower Body Dressing Supervision  (don socks, shoes, jeans)   Toileting   (declined need)   Functional Mobility   Sit to Stand Supervised   Bed, Chair, Wheelchair Transfer Supervised   Transfer Method Stand Step  (no AD)   Mobility bed mobility, short gait and transfers   Distance (Feet) 15   # of Times Distance was Traveled 2   Visual Perception   Visual  Perception  WDL   Comments denies changes   Edema / Skin Assessment   Edema / Skin  Not Assessed   Activity Tolerance   Sitting in Chair >10 min; up post   Sitting Edge of Bed 7 min   Standing 6 min   Comments functional tolerance   Education Group   Education Provided Home Safety;Role of Occupational Therapist   Role of Occupational Therapist Patient Response Patient;Acceptance;Explanation;Verbal Demonstration   Home Safety Patient Response Patient;Acceptance;Explanation;Verbal Demonstration  (use of urinal for night toileting; increasing mindfulness with mobility/activity for fall reduction)   Interdisciplinary Plan of Care Collaboration   IDT Collaboration with  Nursing   Patient Position at End of Therapy Seated;Chair Alarm On;Call Light within Reach;Tray Table within Reach;Phone within Reach   Collaboration Comments OT results and recs; informed RN of hypertension   Session Information   Date / Session Number  7/1 #1

## 2025-07-01 NOTE — CARE PLAN
The patient is Stable - Low risk of patient condition declining or worsening    Shift Goals  Clinical Goals: Fluids, MRI, NIH and Neuro checks,  Patient Goals: rest  Family Goals: james    Progress made toward(s) clinical / shift goals:    Problem: Knowledge Deficit - Stroke Education  Goal: Patient's knowledge of stroke and risk factors will improve  Outcome: Progressing     Problem: Psychosocial - Patient Condition  Goal: Patient's ability to verbalize feelings about condition will improve  Outcome: Progressing     Problem: Discharge Planning - Stroke  Goal: Ensure Stroke Core Measures are met prior to discharge  Outcome: Progressing     Problem: Neuro Status  Goal: Neuro status will remain stable or improve  Outcome: Progressing     Problem: Hemodynamic Monitoring  Goal: Patient's hemodynamics, fluid balance and neurologic status will be stable or improve  Outcome: Progressing       Patient is not progressing towards the following goals:

## 2025-07-01 NOTE — CARE PLAN
The patient is Stable - Low risk of patient condition declining or worsening    Shift Goals  Clinical Goals: MRI, IV fluids, safety  Patient Goals: rest  Family Goals: james    Progress made toward(s) clinical / shift goals:    Problem: Optimal Care for Alcohol Withdrawal  Goal: Optimal Care for the alcohol withdrawal patient  Description: Target End Date:  1 to 3 days    1.  Alcohol history screening done on admission  2.  CIWA-AR score assessment (includes assessment of nausea/vomiting, tremor, sweats, anxiety, agitation, tactile, visual and auditory disturbances, headache and orientation/sensorium).  Document on CIWA flowsheet.  3.  Follow CIWA-AR score protocol  4.  Frequent reorientation  5.  Monitor for fluid and electrolyte imbalance.  6.  Assess for respiratory depression due to sedation (pulse ox)  7.  Consider thiamine, multivitamins, folic acid and magnesium sulfate per provider order  8.  Collaborate with Social Workers/Case Management  9.  Collaborate with mental health  Outcome: Progressing        Problem: Neuro Status  Goal: Neuro status will remain stable or improve  Description: Target End Date:  Prior to discharge or change in level of care    Document on Neuro assessment in the Assessment flowsheet    1.  Assess and monitor neurologic status per provider order/protocol/unit policy  2.  Assess level of consciousness and orientation  3.  Assess for speech, dysarthria, dysphagia, facial symmetry  4.  Assess visual field, eye movements, gaze preference, pupil reaction and size  5.  Assess muscle strength and motor response in all four extremities  6.  Assess for sensation (numbness and tingling)  7.  Assess basic neuro reflexes (cough, gag, corneal)  8.  Identify changes in neuro status and report to provider for testing/treatment orders  Outcome: Progressing     Problem: Hemodynamic Monitoring  Goal: Patient's hemodynamics, fluid balance and neurologic status will be stable or improve  Description:  Target End Date:  Prior to discharge or change in level of care    1.  Vital signs, pulse oximetry and cardiac monitor per provider order and/or policy  2.  Frequent pulse checks performed post thrombectomy  3.  Frequent monitoring for signs of bleeding post TPA administration  4.  Proper management of IV infusions  5.  Intake and output monitored per provider order  6.  Daily weight obtained per unit policy or provider order  7.  Peripheral pulses and capillary refill assessed as needed  8.  Monitor for signs/symptoms of excessive bleeding  9.  Body temperature assessed and fevers treated  10. Patient positioned for maximum circulation/cardiac output  Outcome: Progressing     Problem: Knowledge Deficit - Standard  Goal: Patient and family/care givers will demonstrate understanding of plan of care, disease process/condition, diagnostic tests and medications  Description: Target End Date:  1-3 days or as soon as patient condition allows    Document in Patient Education    1.  Patient and family/caregiver oriented to unit, equipment, visitation policy and means for communicating concern  2.  Complete/review Learning Assessment  3.  Assess knowledge level of disease process/condition, treatment plan, diagnostic tests and medications  4.  Explain disease process/condition, treatment plan, diagnostic tests and medications  Outcome: Progressing     Problem: Fall Risk  Goal: Patient will remain free from falls  Description: Target End Date:  Prior to discharge or change in level of care    Document interventions on the Lynne Reilly Fall Risk Assessment    1.  Assess for fall risk factors  2.  Implement fall precautions  Outcome: Progressing

## 2025-07-01 NOTE — PROGRESS NOTES
Bedside report received and patient care assumed. Pt is resting in bed and wants to rest longer, denying getting to the chair, A&O 4, with no pain, and is on RA. Tele box on. All fall precautions are in place, belongings on bedside table.  Pt was updated on POC, no questions or concerns. Pt educated on use of call light for assistance.

## 2025-07-01 NOTE — DISCHARGE PLANNING
Care Transition Team Assessment    Patient is a 60 year-old male admitted for acute kidney injury. Please see patient's H&P for prior medical history. LMSW met with patient at bedside to complete assessment. Patient is A&Ox4 and able to verify the information on the face sheet. Patient lives alone in a second floor apartment with 16 steps to enter, Katiana Jin (p) [phone]; emergency contact. No Advance Directive on file,  Prior to admission patient is independent with ADL's and IADL’s. Patient does not use any DME at baseline. Patient reported that significant other Katiana Jin is good support for when dishcarged home. Patient reports, they are currently unemployed, semi-retired but not yet receiving SSI benefits.  The patient's PCP is Dr. Peterson @ [facility]. Patient's preferred pharmacy is [facility]. Patient denies a history of SNF/IPR nor HHC use in the past. Patient denies any SA or MH concerns. Patient's confirmed medical coverage via [employer]. Patient has means of transportation when medically cleared and significant other Katiana or an Uber will provide transport once stable for discharge.     Addendum @ 1416: updated in IDT rounds. Patient is back to baseline. No longer in need for an MRI. Will go home and should follow up with outside provider. Patient is medically clear to go home today.    Addendum @1435: LMSW provided taxi voucher for patient to go home    Information Source  Orientation Level: Oriented X4  Information Given By: Patient  Who is responsible for making decisions for patient? : Patient    Readmission Evaluation  Is this a readmission?: No    Elopement Risk  Legal Hold: No  Ambulatory or Self Mobile in Wheelchair: Yes  Disoriented: No  Psychiatric Symptoms: None  History of Wandering: No  Elopement this Admit: No  Vocalizing Wanting to Leave: No  Displays Behaviors, Body Language Wanting to Leave: No-Not at Risk for Elopement  Elopement Risk: Not at Risk for  Elopement    Interdisciplinary Discharge Planning  Lives with - Patient's Self Care Capacity: Alone and Able to Care For Self  Housing / Facility: 2 Story Apartment / Condo    Discharge Preparedness  What is your plan after discharge?: Home with help  What are your discharge supports?: Other (comment) (pt's significant other Katiana Jin)  Prior Functional Level: Ambulatory  Difficulity with ADLs: None  Difficulity with IADLs: None    Functional Assesment  Prior Functional Level: Ambulatory    Finances  Financial Barriers to Discharge: No  Prescription Coverage: Yes    Vision / Hearing Impairment  Right Eye Vision: Impaired  Left Eye Vision: Impaired         Advance Directive  Advance Directive?: None    Domestic Abuse  Have you ever been the victim of abuse or violence?: No    Psychological Assessment  History of Substance Abuse: None  History of Psychiatric Problems: No  Non-compliant with Treatment: No  Newly Diagnosed Illness: No    Discharge Risks or Barriers  Patient risk factors: Complex medical needs, Lack of outside supports    Anticipated Discharge Information  Discharge Disposition: Discharged to home/self care (01)

## (undated) DEVICE — TUBING CLEARLINK DUO-VENT - C-FLO (48EA/CA)

## (undated) DEVICE — GLOVE BIOGEL SZ 7 SURGICAL PF LTX - (50PR/BX 4BX/CA)

## (undated) DEVICE — CHLORAPREP 26 ML APPLICATOR - ORANGE TINT(25/CA)

## (undated) DEVICE — LACTATED RINGERS INJ 1000 ML - (14EA/CA 60CA/PF)

## (undated) DEVICE — KIT ROOM DECONTAMINATION

## (undated) DEVICE — SPACEMAKER PREP DIST BALLOON - (5/BX) KIT COMPONENT----USE ITEM 14468---

## (undated) DEVICE — NEPTUNE 4 PORT MANIFOLD - (20/PK)

## (undated) DEVICE — SODIUM CHL IRRIGATION 0.9% 1000ML (12EA/CA)

## (undated) DEVICE — BANDAID SHEER STRIP 3/4 IN (100EA/BX 12BX/CA)

## (undated) DEVICE — SET EXTENSION WITH 2 PORTS (48EA/CA) ***PART #2C8610 IS A SUBSTITUTE*****

## (undated) DEVICE — GLOVE BIOGEL INDICATOR SZ 7.5 SURGICAL PF LTX - (50PR/BX 4BX/CA)

## (undated) DEVICE — PROTECTOR ULNA NERVE - (36PR/CA)

## (undated) DEVICE — SUTURE 4-0 VICRYL PLUSFS-1 - 27 INCH (36/BX)

## (undated) DEVICE — TROCAR, CAN&SEAL 5X100MM C0509

## (undated) DEVICE — SENSOR SPO2 NEO LNCS ADHESIVE (20/BX) SEE USER NOTES

## (undated) DEVICE — TROCAR 5X100 NON BLADED Z-TH - READ KII (6/BX)

## (undated) DEVICE — ELECTRODE 850 FOAM ADHESIVE - HYDROGEL RADIOTRNSPRNT (50/PK)

## (undated) DEVICE — KIT ANESTHESIA W/CIRCUIT & 3/LT BAG W/FILTER (20EA/CA)

## (undated) DEVICE — GOWN WARMING STANDARD FLEX - (30/CA)

## (undated) DEVICE — SUTURE GENERAL

## (undated) DEVICE — PACK LAP CHOLE OR - (2EA/CA)

## (undated) DEVICE — CLOSURE SKIN STRIP 1/2 X 4 IN - (STERI STRIP) (50/BX 4BX/CA)

## (undated) DEVICE — SUCTION INSTRUMENT YANKAUER BULBOUS TIP W/O VENT (50EA/CA)

## (undated) DEVICE — HEAD HOLDER JUNIOR/ADULT

## (undated) DEVICE — SLEEVE, VASO, THIGH, MED

## (undated) DEVICE — STERI STRIP COMPOUND BENZOIN - TINCTURE 0.6ML WITH APPLICATOR (40EA/BX)

## (undated) DEVICE — TUBE E-T HI-LO CUFF 7.0MM (10EA/PK)

## (undated) DEVICE — ANTI-FOG SOLUTION - 60BTL/CA

## (undated) DEVICE — CLIP APPLIER 10MM ENDO LARGE (3EA/BX)

## (undated) DEVICE — WATER IRRIG. STER. 1500 ML - (9/CA)

## (undated) DEVICE — SPACEMAKER BLUNT TIP TROCAR - 10MM (5/BX) KIT COMPONENT----USE ITEM 14468---

## (undated) DEVICE — CANNULA W/SEAL 5X100 Z-THRE - ADED KII (12/BX)

## (undated) DEVICE — SUTURE 0 VICRYL PLUS UR-6 - 27 INCH (36/BX)

## (undated) DEVICE — SET LEADWIRE 5 LEAD BEDSIDE DISPOSABLE ECG (1SET OF 5/EA)

## (undated) DEVICE — ELECTRODE DUAL RETURN W/ CORD - (50/PK)

## (undated) DEVICE — CANISTER SUCTION 3000ML MECHANICAL FILTER AUTO SHUTOFF MEDI-VAC NONSTERILE LF DISP  (40EA/CA)

## (undated) DEVICE — MASK ANESTHESIA ADULT  - (100/CA)

## (undated) DEVICE — TUBING INSUFFLATION - (10/BX)